# Patient Record
Sex: FEMALE | Race: WHITE | NOT HISPANIC OR LATINO | URBAN - METROPOLITAN AREA
[De-identification: names, ages, dates, MRNs, and addresses within clinical notes are randomized per-mention and may not be internally consistent; named-entity substitution may affect disease eponyms.]

---

## 2019-05-07 ENCOUNTER — INPATIENT HOSPITAL (OUTPATIENT)
Dept: URBAN - METROPOLITAN AREA HOSPITAL 107 | Facility: HOSPITAL | Age: 44
End: 2019-05-07
Payer: COMMERCIAL

## 2019-05-07 DIAGNOSIS — B18.2 CHRONIC VIRAL HEPATITIS C: ICD-10-CM

## 2019-05-07 DIAGNOSIS — D50.9 IRON DEFICIENCY ANEMIA, UNSPECIFIED: ICD-10-CM

## 2019-05-07 DIAGNOSIS — J96.00 ACUTE RESPIRATORY FAILURE, UNSPECIFIED WHETHER WITH HYPOXIA: ICD-10-CM

## 2019-05-07 DIAGNOSIS — R94.5 ABNORMAL RESULTS OF LIVER FUNCTION STUDIES: ICD-10-CM

## 2019-05-07 DIAGNOSIS — D69.3 IMMUNE THROMBOCYTOPENIC PURPURA: ICD-10-CM

## 2019-05-07 DIAGNOSIS — R13.10 DYSPHAGIA, UNSPECIFIED: ICD-10-CM

## 2019-05-07 PROCEDURE — 99223 1ST HOSP IP/OBS HIGH 75: CPT | Performed by: PHYSICIAN ASSISTANT

## 2019-05-08 ENCOUNTER — INPATIENT HOSPITAL (OUTPATIENT)
Dept: URBAN - METROPOLITAN AREA HOSPITAL 107 | Facility: HOSPITAL | Age: 44
End: 2019-05-08
Payer: COMMERCIAL

## 2019-05-08 DIAGNOSIS — E46 UNSPECIFIED PROTEIN-CALORIE MALNUTRITION: ICD-10-CM

## 2019-05-08 DIAGNOSIS — R13.10 DYSPHAGIA, UNSPECIFIED: ICD-10-CM

## 2019-05-08 PROCEDURE — 43246 EGD PLACE GASTROSTOMY TUBE: CPT | Performed by: INTERNAL MEDICINE

## 2019-05-09 ENCOUNTER — INPATIENT HOSPITAL (OUTPATIENT)
Dept: URBAN - METROPOLITAN AREA HOSPITAL 107 | Facility: HOSPITAL | Age: 44
End: 2019-05-09
Payer: COMMERCIAL

## 2019-05-09 DIAGNOSIS — A41.9 SEPSIS, UNSPECIFIED ORGANISM: ICD-10-CM

## 2019-05-09 DIAGNOSIS — Z93.1 GASTROSTOMY STATUS: ICD-10-CM

## 2019-05-09 DIAGNOSIS — R13.10 DYSPHAGIA, UNSPECIFIED: ICD-10-CM

## 2019-05-09 PROCEDURE — 99232 SBSQ HOSP IP/OBS MODERATE 35: CPT

## 2021-12-16 ENCOUNTER — TELEPHONE (OUTPATIENT)
Dept: CARDIAC SURGERY | Facility: CLINIC | Age: 46
End: 2021-12-16

## 2021-12-16 ENCOUNTER — HOSPITAL ENCOUNTER (INPATIENT)
Facility: HOSPITAL | Age: 46
LOS: 1 days | Discharge: LEFT BEFORE DISCHARGE | End: 2021-12-17
Attending: EMERGENCY MEDICINE | Admitting: INTERNAL MEDICINE

## 2021-12-16 ENCOUNTER — APPOINTMENT (OUTPATIENT)
Dept: CARDIOLOGY | Facility: HOSPITAL | Age: 46
End: 2021-12-16

## 2021-12-16 ENCOUNTER — APPOINTMENT (OUTPATIENT)
Dept: GENERAL RADIOLOGY | Facility: HOSPITAL | Age: 46
End: 2021-12-16

## 2021-12-16 ENCOUNTER — APPOINTMENT (OUTPATIENT)
Dept: CT IMAGING | Facility: HOSPITAL | Age: 46
End: 2021-12-16

## 2021-12-16 DIAGNOSIS — D65 CONSUMPTIVE COAGULOPATHY: ICD-10-CM

## 2021-12-16 DIAGNOSIS — I33.0 SUBACUTE BACTERIAL ENDOCARDITIS: Primary | ICD-10-CM

## 2021-12-16 DIAGNOSIS — N17.9 AKI (ACUTE KIDNEY INJURY): ICD-10-CM

## 2021-12-16 DIAGNOSIS — I76 SEPTIC EMBOLISM: ICD-10-CM

## 2021-12-16 DIAGNOSIS — D65 DIC (DISSEMINATED INTRAVASCULAR COAGULATION): ICD-10-CM

## 2021-12-16 PROBLEM — F19.10 POLYSUBSTANCE ABUSE: Status: ACTIVE | Noted: 2021-12-16

## 2021-12-16 PROBLEM — E87.20 LACTIC ACIDOSIS: Status: ACTIVE | Noted: 2021-12-16

## 2021-12-16 PROBLEM — E66.9 OBESITY (BMI 30-39.9): Status: ACTIVE | Noted: 2021-12-16

## 2021-12-16 PROBLEM — Z72.0 TOBACCO ABUSE: Status: ACTIVE | Noted: 2021-12-16

## 2021-12-16 PROBLEM — E83.42 HYPOMAGNESEMIA: Status: ACTIVE | Noted: 2021-12-16

## 2021-12-16 LAB
ABO GROUP BLD: NORMAL
ALBUMIN SERPL-MCNC: 1.9 G/DL (ref 3.5–5.2)
ALBUMIN/GLOB SERPL: 0.4 G/DL
ALP SERPL-CCNC: 95 U/L (ref 39–117)
ALT SERPL W P-5'-P-CCNC: 11 U/L (ref 1–33)
AMPHET+METHAMPHET UR QL: NEGATIVE
AMPHETAMINES UR QL: POSITIVE
ANION GAP SERPL CALCULATED.3IONS-SCNC: 10 MMOL/L (ref 5–15)
ANISOCYTOSIS BLD QL: ABNORMAL
APAP SERPL-MCNC: <5 MCG/ML (ref 0–30)
APTT PPP: 32 SECONDS (ref 24.1–35)
ARTERIAL PATENCY WRIST A: POSITIVE
AST SERPL-CCNC: 23 U/L (ref 1–32)
ATMOSPHERIC PRESS: 750 MMHG
BACTERIA BLD CULT: ABNORMAL
BACTERIA UR QL AUTO: ABNORMAL /HPF
BARBITURATES UR QL SCN: NEGATIVE
BASE EXCESS BLDA CALC-SCNC: 0.3 MMOL/L (ref 0–2)
BDY SITE: ABNORMAL
BENZODIAZ UR QL SCN: NEGATIVE
BH CV ECHO MEAS - ACS: 2.9 CM
BH CV ECHO MEAS - AO MAX PG (FULL): -0.55 MMHG
BH CV ECHO MEAS - AO MAX PG: 11.6 MMHG
BH CV ECHO MEAS - AO MEAN PG (FULL): -1 MMHG
BH CV ECHO MEAS - AO MEAN PG: 6 MMHG
BH CV ECHO MEAS - AO ROOT AREA (BSA CORRECTED): 1.6
BH CV ECHO MEAS - AO ROOT AREA: 7.5 CM^2
BH CV ECHO MEAS - AO ROOT DIAM: 3.1 CM
BH CV ECHO MEAS - AO V2 MAX: 170 CM/SEC
BH CV ECHO MEAS - AO V2 MEAN: 114 CM/SEC
BH CV ECHO MEAS - AO V2 VTI: 26 CM
BH CV ECHO MEAS - AVA(I,A): 4.7 CM^2
BH CV ECHO MEAS - AVA(I,D): 4.7 CM^2
BH CV ECHO MEAS - AVA(V,A): 4.3 CM^2
BH CV ECHO MEAS - AVA(V,D): 4.3 CM^2
BH CV ECHO MEAS - BSA(HAYCOCK): 2.1 M^2
BH CV ECHO MEAS - BSA: 2 M^2
BH CV ECHO MEAS - BZI_BMI: 33.3 KILOGRAMS/M^2
BH CV ECHO MEAS - BZI_METRIC_HEIGHT: 165.1 CM
BH CV ECHO MEAS - BZI_METRIC_WEIGHT: 90.7 KG
BH CV ECHO MEAS - EDV(CUBED): 161.9 ML
BH CV ECHO MEAS - EDV(MOD-SP4): 113 ML
BH CV ECHO MEAS - EDV(TEICH): 144.4 ML
BH CV ECHO MEAS - EF(CUBED): 87.4 %
BH CV ECHO MEAS - EF(MOD-SP4): 77.3 %
BH CV ECHO MEAS - EF(TEICH): 80.8 %
BH CV ECHO MEAS - ESV(CUBED): 20.3 ML
BH CV ECHO MEAS - ESV(MOD-SP4): 25.6 ML
BH CV ECHO MEAS - ESV(TEICH): 27.8 ML
BH CV ECHO MEAS - FS: 49.9 %
BH CV ECHO MEAS - IVS/LVPW: 0.96
BH CV ECHO MEAS - IVSD: 1.1 CM
BH CV ECHO MEAS - LA DIMENSION: 3.7 CM
BH CV ECHO MEAS - LA/AO: 1.2
BH CV ECHO MEAS - LAT PEAK E' VEL: 14.6 CM/SEC
BH CV ECHO MEAS - LV DIASTOLIC VOL/BSA (35-75): 57.1 ML/M^2
BH CV ECHO MEAS - LV MASS(C)D: 244.3 GRAMS
BH CV ECHO MEAS - LV MASS(C)DI: 123.5 GRAMS/M^2
BH CV ECHO MEAS - LV MAX PG: 12.1 MMHG
BH CV ECHO MEAS - LV MEAN PG: 7 MMHG
BH CV ECHO MEAS - LV SYSTOLIC VOL/BSA (12-30): 12.9 ML/M^2
BH CV ECHO MEAS - LV V1 MAX: 174 CM/SEC
BH CV ECHO MEAS - LV V1 MEAN: 118 CM/SEC
BH CV ECHO MEAS - LV V1 VTI: 29.5 CM
BH CV ECHO MEAS - LVIDD: 5.5 CM
BH CV ECHO MEAS - LVIDS: 2.7 CM
BH CV ECHO MEAS - LVLD AP4: 8.5 CM
BH CV ECHO MEAS - LVLS AP4: 5.9 CM
BH CV ECHO MEAS - LVOT AREA (M): 4.2 CM^2
BH CV ECHO MEAS - LVOT AREA: 4.2 CM^2
BH CV ECHO MEAS - LVOT DIAM: 2.3 CM
BH CV ECHO MEAS - LVPWD: 1.1 CM
BH CV ECHO MEAS - MED PEAK E' VEL: 10 CM/SEC
BH CV ECHO MEAS - MR MAX PG: 51.8 MMHG
BH CV ECHO MEAS - MR MAX VEL: 360 CM/SEC
BH CV ECHO MEAS - MR MEAN PG: 32 MMHG
BH CV ECHO MEAS - MR MEAN VEL: 258 CM/SEC
BH CV ECHO MEAS - MR VTI: 104 CM
BH CV ECHO MEAS - MV A MAX VEL: 57.8 CM/SEC
BH CV ECHO MEAS - MV DEC SLOPE: 560 CM/SEC^2
BH CV ECHO MEAS - MV DEC TIME: 0.22 SEC
BH CV ECHO MEAS - MV E MAX VEL: 72.8 CM/SEC
BH CV ECHO MEAS - MV E/A: 1.3
BH CV ECHO MEAS - MV P1/2T MAX VEL: 101 CM/SEC
BH CV ECHO MEAS - MV P1/2T: 52.8 MSEC
BH CV ECHO MEAS - MVA P1/2T LCG: 2.2 CM^2
BH CV ECHO MEAS - MVA(P1/2T): 4.2 CM^2
BH CV ECHO MEAS - PA MAX PG: 13 MMHG
BH CV ECHO MEAS - PA V2 MAX: 180 CM/SEC
BH CV ECHO MEAS - RAP SYSTOLE: 10 MMHG
BH CV ECHO MEAS - RVDD: 3.3 CM
BH CV ECHO MEAS - RVSP: 54.6 MMHG
BH CV ECHO MEAS - SI(AO): 99.2 ML/M^2
BH CV ECHO MEAS - SI(CUBED): 71.5 ML/M^2
BH CV ECHO MEAS - SI(LVOT): 62 ML/M^2
BH CV ECHO MEAS - SI(MOD-SP4): 44.2 ML/M^2
BH CV ECHO MEAS - SI(TEICH): 58.9 ML/M^2
BH CV ECHO MEAS - SV(AO): 196.2 ML
BH CV ECHO MEAS - SV(CUBED): 141.5 ML
BH CV ECHO MEAS - SV(LVOT): 122.6 ML
BH CV ECHO MEAS - SV(MOD-SP4): 87.4 ML
BH CV ECHO MEAS - SV(TEICH): 116.6 ML
BH CV ECHO MEAS - TR MAX VEL: 334 CM/SEC
BH CV ECHO MEASUREMENTS AVERAGE E/E' RATIO: 5.92
BILIRUB SERPL-MCNC: 0.7 MG/DL (ref 0–1.2)
BILIRUB UR QL STRIP: NEGATIVE
BLD GP AB SCN SERPL QL: NEGATIVE
BODY TEMPERATURE: 37 C
BUN SERPL-MCNC: 20 MG/DL (ref 6–20)
BUN/CREAT SERPL: 12.8 (ref 7–25)
BUPRENORPHINE SERPL-MCNC: NEGATIVE NG/ML
CALCIUM SPEC-SCNC: 7.4 MG/DL (ref 8.6–10.5)
CANNABINOIDS SERPL QL: NEGATIVE
CHLORIDE SERPL-SCNC: 99 MMOL/L (ref 98–107)
CK SERPL-CCNC: 14 U/L (ref 20–180)
CLARITY UR: ABNORMAL
CO2 SERPL-SCNC: 22 MMOL/L (ref 22–29)
COCAINE UR QL: NEGATIVE
COLOR UR: ABNORMAL
CREAT SERPL-MCNC: 1.56 MG/DL (ref 0.57–1)
CRP SERPL-MCNC: 15.1 MG/DL (ref 0–0.5)
D DIMER PPP FEU-MCNC: 5.72 MG/L (FEU) (ref 0–0.5)
D-LACTATE SERPL-SCNC: 1.5 MMOL/L (ref 0.5–2)
D-LACTATE SERPL-SCNC: 3.3 MMOL/L (ref 0.5–2)
DEPRECATED RDW RBC AUTO: 51.2 FL (ref 37–54)
ERYTHROCYTE [DISTWIDTH] IN BLOOD BY AUTOMATED COUNT: 17.5 % (ref 12.3–15.4)
ERYTHROCYTE [SEDIMENTATION RATE] IN BLOOD: 41 MM/HR (ref 0–20)
ETHANOL UR QL: <0.01 %
FIBRINOGEN PPP-MCNC: 331 MG/DL (ref 240–460)
GFR SERPL CREATININE-BSD FRML MDRD: 36 ML/MIN/1.73
GLOBULIN UR ELPH-MCNC: 5.3 GM/DL
GLUCOSE SERPL-MCNC: 137 MG/DL (ref 65–99)
GLUCOSE UR STRIP-MCNC: NEGATIVE MG/DL
GRAN CASTS URNS QL MICRO: ABNORMAL /LPF
HCG SERPL QL: NEGATIVE
HCO3 BLDA-SCNC: 22.9 MMOL/L (ref 20–26)
HCT VFR BLD AUTO: 22.7 % (ref 34–46.6)
HGB BLD-MCNC: 7.3 G/DL (ref 12–15.9)
HGB UR QL STRIP.AUTO: ABNORMAL
HYPOCHROMIA BLD QL: ABNORMAL
INR PPP: 1.1 (ref 0.91–1.09)
KETONES UR QL STRIP: NEGATIVE
LEFT ATRIUM VOLUME INDEX: 18.7 ML/M2
LEFT ATRIUM VOLUME: 37 CM3
LEUKOCYTE ESTERASE UR QL STRIP.AUTO: ABNORMAL
LYMPHOCYTES # BLD MANUAL: 1.07 10*3/MM3 (ref 0.7–3.1)
LYMPHOCYTES NFR BLD MANUAL: 6 % (ref 5–12)
Lab: ABNORMAL
MAGNESIUM SERPL-MCNC: 1.5 MG/DL (ref 1.6–2.6)
MCH RBC QN AUTO: 26.5 PG (ref 26.6–33)
MCHC RBC AUTO-ENTMCNC: 32.2 G/DL (ref 31.5–35.7)
MCV RBC AUTO: 82.5 FL (ref 79–97)
METHADONE UR QL SCN: NEGATIVE
MODALITY: ABNORMAL
MONOCYTES # BLD: 1.29 10*3/MM3 (ref 0.1–0.9)
NEUTROPHILS # BLD AUTO: 19.1 10*3/MM3 (ref 1.7–7)
NEUTROPHILS NFR BLD MANUAL: 81 % (ref 42.7–76)
NEUTS BAND NFR BLD MANUAL: 8 % (ref 0–5)
NITRITE UR QL STRIP: NEGATIVE
NRBC SPEC MANUAL: 1 /100 WBC (ref 0–0.2)
NT-PROBNP SERPL-MCNC: 8547 PG/ML (ref 0–450)
OPIATES UR QL: NEGATIVE
OXYCODONE UR QL SCN: NEGATIVE
PCO2 BLDA: 27.8 MM HG (ref 35–45)
PCO2 TEMP ADJ BLD: 27.8 MM HG (ref 35–45)
PCP UR QL SCN: NEGATIVE
PH BLDA: 7.52 PH UNITS (ref 7.35–7.45)
PH UR STRIP.AUTO: <=5 [PH] (ref 5–8)
PH, TEMP CORRECTED: 7.52 PH UNITS (ref 7.35–7.45)
PLATELET # BLD AUTO: 39 10*3/MM3 (ref 140–450)
PO2 BLDA: 75.2 MM HG (ref 83–108)
PO2 TEMP ADJ BLD: 75.2 MM HG (ref 83–108)
POIKILOCYTOSIS BLD QL SMEAR: ABNORMAL
POLYCHROMASIA BLD QL SMEAR: ABNORMAL
POTASSIUM SERPL-SCNC: 3.7 MMOL/L (ref 3.5–5.2)
PROCALCITONIN SERPL-MCNC: 1.11 NG/ML (ref 0–0.25)
PROPOXYPH UR QL: NEGATIVE
PROT SERPL-MCNC: 7.2 G/DL (ref 6–8.5)
PROT UR QL STRIP: ABNORMAL
PROTHROMBIN TIME: 13.8 SECONDS (ref 11.9–14.6)
QT INTERVAL: 326 MS
QTC INTERVAL: 472 MS
RBC # BLD AUTO: 2.75 10*6/MM3 (ref 3.77–5.28)
RBC # UR STRIP: ABNORMAL /HPF
REF LAB TEST METHOD: ABNORMAL
RETICS # AUTO: 0.08 10*6/MM3 (ref 0.02–0.13)
RETICS/RBC NFR AUTO: 2.88 % (ref 0.7–1.9)
RH BLD: POSITIVE
SALICYLATES SERPL-MCNC: <0.3 MG/DL
SAO2 % BLDCOA: 97.2 % (ref 94–99)
SARS-COV-2 RNA PNL SPEC NAA+PROBE: NOT DETECTED
SCHISTOCYTES BLD QL SMEAR: ABNORMAL
SMALL PLATELETS BLD QL SMEAR: ABNORMAL
SODIUM SERPL-SCNC: 131 MMOL/L (ref 136–145)
SP GR UR STRIP: 1.01 (ref 1–1.03)
SQUAMOUS #/AREA URNS HPF: ABNORMAL /HPF
T&S EXPIRATION DATE: NORMAL
TRICYCLICS UR QL SCN: NEGATIVE
TROPONIN T SERPL-MCNC: <0.01 NG/ML (ref 0–0.03)
UROBILINOGEN UR QL STRIP: ABNORMAL
VARIANT LYMPHS NFR BLD MANUAL: 5 % (ref 19.6–45.3)
VENTILATOR MODE: ABNORMAL
WBC # UR STRIP: ABNORMAL /HPF
WBC MORPH BLD: NORMAL
WBC NRBC COR # BLD: 21.46 10*3/MM3 (ref 3.4–10.8)

## 2021-12-16 PROCEDURE — 86900 BLOOD TYPING SEROLOGIC ABO: CPT | Performed by: EMERGENCY MEDICINE

## 2021-12-16 PROCEDURE — 82077 ASSAY SPEC XCP UR&BREATH IA: CPT | Performed by: EMERGENCY MEDICINE

## 2021-12-16 PROCEDURE — 87186 SC STD MICRODIL/AGAR DIL: CPT | Performed by: EMERGENCY MEDICINE

## 2021-12-16 PROCEDURE — 0 IOPAMIDOL PER 1 ML: Performed by: EMERGENCY MEDICINE

## 2021-12-16 PROCEDURE — 83880 ASSAY OF NATRIURETIC PEPTIDE: CPT | Performed by: EMERGENCY MEDICINE

## 2021-12-16 PROCEDURE — 82803 BLOOD GASES ANY COMBINATION: CPT

## 2021-12-16 PROCEDURE — 86850 RBC ANTIBODY SCREEN: CPT | Performed by: EMERGENCY MEDICINE

## 2021-12-16 PROCEDURE — 84145 PROCALCITONIN (PCT): CPT | Performed by: EMERGENCY MEDICINE

## 2021-12-16 PROCEDURE — 25010000002 FENTANYL CITRATE (PF) 50 MCG/ML SOLUTION: Performed by: EMERGENCY MEDICINE

## 2021-12-16 PROCEDURE — 80179 DRUG ASSAY SALICYLATE: CPT | Performed by: EMERGENCY MEDICINE

## 2021-12-16 PROCEDURE — 02HV33Z INSERTION OF INFUSION DEVICE INTO SUPERIOR VENA CAVA, PERCUTANEOUS APPROACH: ICD-10-PCS | Performed by: INTERNAL MEDICINE

## 2021-12-16 PROCEDURE — 85384 FIBRINOGEN ACTIVITY: CPT | Performed by: EMERGENCY MEDICINE

## 2021-12-16 PROCEDURE — 85025 COMPLETE CBC W/AUTO DIFF WBC: CPT | Performed by: EMERGENCY MEDICINE

## 2021-12-16 PROCEDURE — 85007 BL SMEAR W/DIFF WBC COUNT: CPT | Performed by: EMERGENCY MEDICINE

## 2021-12-16 PROCEDURE — 87040 BLOOD CULTURE FOR BACTERIA: CPT | Performed by: EMERGENCY MEDICINE

## 2021-12-16 PROCEDURE — 85045 AUTOMATED RETICULOCYTE COUNT: CPT | Performed by: EMERGENCY MEDICINE

## 2021-12-16 PROCEDURE — 25010000002 VANCOMYCIN 1 G RECONSTITUTED SOLUTION 1 EACH VIAL: Performed by: EMERGENCY MEDICINE

## 2021-12-16 PROCEDURE — 71045 X-RAY EXAM CHEST 1 VIEW: CPT

## 2021-12-16 PROCEDURE — 80053 COMPREHEN METABOLIC PANEL: CPT | Performed by: EMERGENCY MEDICINE

## 2021-12-16 PROCEDURE — 86140 C-REACTIVE PROTEIN: CPT | Performed by: EMERGENCY MEDICINE

## 2021-12-16 PROCEDURE — 80306 DRUG TEST PRSMV INSTRMNT: CPT | Performed by: EMERGENCY MEDICINE

## 2021-12-16 PROCEDURE — 36600 WITHDRAWAL OF ARTERIAL BLOOD: CPT

## 2021-12-16 PROCEDURE — 25010000002 VANCOMYCIN 10 G RECONSTITUTED SOLUTION: Performed by: INTERNAL MEDICINE

## 2021-12-16 PROCEDURE — 85610 PROTHROMBIN TIME: CPT | Performed by: EMERGENCY MEDICINE

## 2021-12-16 PROCEDURE — 80143 DRUG ASSAY ACETAMINOPHEN: CPT | Performed by: EMERGENCY MEDICINE

## 2021-12-16 PROCEDURE — 85379 FIBRIN DEGRADATION QUANT: CPT | Performed by: EMERGENCY MEDICINE

## 2021-12-16 PROCEDURE — 86901 BLOOD TYPING SEROLOGIC RH(D): CPT

## 2021-12-16 PROCEDURE — 93306 TTE W/DOPPLER COMPLETE: CPT

## 2021-12-16 PROCEDURE — 93005 ELECTROCARDIOGRAM TRACING: CPT | Performed by: EMERGENCY MEDICINE

## 2021-12-16 PROCEDURE — P9612 CATHETERIZE FOR URINE SPEC: HCPCS

## 2021-12-16 PROCEDURE — 70450 CT HEAD/BRAIN W/O DYE: CPT

## 2021-12-16 PROCEDURE — 71275 CT ANGIOGRAPHY CHEST: CPT

## 2021-12-16 PROCEDURE — 99253 IP/OBS CNSLTJ NEW/EST LOW 45: CPT | Performed by: SURGERY

## 2021-12-16 PROCEDURE — 86900 BLOOD TYPING SEROLOGIC ABO: CPT

## 2021-12-16 PROCEDURE — 87147 CULTURE TYPE IMMUNOLOGIC: CPT | Performed by: EMERGENCY MEDICINE

## 2021-12-16 PROCEDURE — 87150 DNA/RNA AMPLIFIED PROBE: CPT | Performed by: EMERGENCY MEDICINE

## 2021-12-16 PROCEDURE — 85652 RBC SED RATE AUTOMATED: CPT | Performed by: EMERGENCY MEDICINE

## 2021-12-16 PROCEDURE — 94799 UNLISTED PULMONARY SVC/PX: CPT

## 2021-12-16 PROCEDURE — 84703 CHORIONIC GONADOTROPIN ASSAY: CPT | Performed by: EMERGENCY MEDICINE

## 2021-12-16 PROCEDURE — 83735 ASSAY OF MAGNESIUM: CPT | Performed by: EMERGENCY MEDICINE

## 2021-12-16 PROCEDURE — 85730 THROMBOPLASTIN TIME PARTIAL: CPT | Performed by: EMERGENCY MEDICINE

## 2021-12-16 PROCEDURE — 86920 COMPATIBILITY TEST SPIN: CPT

## 2021-12-16 PROCEDURE — 93306 TTE W/DOPPLER COMPLETE: CPT | Performed by: INTERNAL MEDICINE

## 2021-12-16 PROCEDURE — 87635 SARS-COV-2 COVID-19 AMP PRB: CPT | Performed by: EMERGENCY MEDICINE

## 2021-12-16 PROCEDURE — 82550 ASSAY OF CK (CPK): CPT | Performed by: EMERGENCY MEDICINE

## 2021-12-16 PROCEDURE — 86901 BLOOD TYPING SEROLOGIC RH(D): CPT | Performed by: EMERGENCY MEDICINE

## 2021-12-16 PROCEDURE — C1751 CATH, INF, PER/CENT/MIDLINE: HCPCS

## 2021-12-16 PROCEDURE — 25010000002 CEFEPIME PER 500 MG: Performed by: INTERNAL MEDICINE

## 2021-12-16 PROCEDURE — 74177 CT ABD & PELVIS W/CONTRAST: CPT

## 2021-12-16 PROCEDURE — 87086 URINE CULTURE/COLONY COUNT: CPT | Performed by: EMERGENCY MEDICINE

## 2021-12-16 PROCEDURE — 25010000002 KETOROLAC TROMETHAMINE PER 15 MG: Performed by: INTERNAL MEDICINE

## 2021-12-16 PROCEDURE — 83605 ASSAY OF LACTIC ACID: CPT | Performed by: EMERGENCY MEDICINE

## 2021-12-16 PROCEDURE — 84484 ASSAY OF TROPONIN QUANT: CPT | Performed by: EMERGENCY MEDICINE

## 2021-12-16 PROCEDURE — 25010000002 PIPERACILLIN SOD-TAZOBACTAM PER 1 G: Performed by: EMERGENCY MEDICINE

## 2021-12-16 PROCEDURE — 93010 ELECTROCARDIOGRAM REPORT: CPT | Performed by: INTERNAL MEDICINE

## 2021-12-16 PROCEDURE — 81001 URINALYSIS AUTO W/SCOPE: CPT | Performed by: EMERGENCY MEDICINE

## 2021-12-16 PROCEDURE — 99285 EMERGENCY DEPT VISIT HI MDM: CPT

## 2021-12-16 PROCEDURE — 25010000002 ZIPRASIDONE MESYLATE PER 10 MG: Performed by: INTERNAL MEDICINE

## 2021-12-16 RX ORDER — KETOROLAC TROMETHAMINE 30 MG/ML
30 INJECTION, SOLUTION INTRAMUSCULAR; INTRAVENOUS EVERY 6 HOURS PRN
Status: DISCONTINUED | OUTPATIENT
Start: 2021-12-16 | End: 2021-12-17

## 2021-12-16 RX ORDER — ONDANSETRON 4 MG/1
4 TABLET, FILM COATED ORAL EVERY 6 HOURS PRN
Status: DISCONTINUED | OUTPATIENT
Start: 2021-12-16 | End: 2021-12-17 | Stop reason: HOSPADM

## 2021-12-16 RX ORDER — IPRATROPIUM BROMIDE AND ALBUTEROL SULFATE 2.5; .5 MG/3ML; MG/3ML
3 SOLUTION RESPIRATORY (INHALATION) EVERY 4 HOURS PRN
Status: DISCONTINUED | OUTPATIENT
Start: 2021-12-16 | End: 2021-12-17 | Stop reason: HOSPADM

## 2021-12-16 RX ORDER — ONDANSETRON 4 MG/1
4 TABLET, FILM COATED ORAL EVERY 6 HOURS PRN
Status: CANCELLED | OUTPATIENT
Start: 2021-12-16

## 2021-12-16 RX ORDER — LORAZEPAM 2 MG/ML
1 INJECTION INTRAMUSCULAR ONCE
Status: COMPLETED | OUTPATIENT
Start: 2021-12-17 | End: 2021-12-17

## 2021-12-16 RX ORDER — ACETAMINOPHEN 325 MG/1
650 TABLET ORAL EVERY 4 HOURS PRN
Status: DISCONTINUED | OUTPATIENT
Start: 2021-12-16 | End: 2021-12-17 | Stop reason: HOSPADM

## 2021-12-16 RX ORDER — ZIPRASIDONE MESYLATE 20 MG/ML
20 INJECTION, POWDER, LYOPHILIZED, FOR SOLUTION INTRAMUSCULAR ONCE
Status: COMPLETED | OUTPATIENT
Start: 2021-12-16 | End: 2021-12-16

## 2021-12-16 RX ORDER — ACETAMINOPHEN 500 MG
1000 TABLET ORAL ONCE
Status: COMPLETED | OUTPATIENT
Start: 2021-12-16 | End: 2021-12-16

## 2021-12-16 RX ORDER — SODIUM CHLORIDE 0.9 % (FLUSH) 0.9 %
10 SYRINGE (ML) INJECTION AS NEEDED
Status: DISCONTINUED | OUTPATIENT
Start: 2021-12-16 | End: 2021-12-17 | Stop reason: HOSPADM

## 2021-12-16 RX ORDER — NICOTINE 21 MG/24HR
1 PATCH, TRANSDERMAL 24 HOURS TRANSDERMAL
Status: DISCONTINUED | OUTPATIENT
Start: 2021-12-16 | End: 2021-12-17 | Stop reason: HOSPADM

## 2021-12-16 RX ORDER — SODIUM CHLORIDE 0.9 % (FLUSH) 0.9 %
10 SYRINGE (ML) INJECTION AS NEEDED
Status: CANCELLED | OUTPATIENT
Start: 2021-12-16

## 2021-12-16 RX ORDER — ACETAMINOPHEN 650 MG/1
650 SUPPOSITORY RECTAL EVERY 4 HOURS PRN
Status: DISCONTINUED | OUTPATIENT
Start: 2021-12-16 | End: 2021-12-17 | Stop reason: HOSPADM

## 2021-12-16 RX ORDER — LANOLIN ALCOHOL/MO/W.PET/CERES
6 CREAM (GRAM) TOPICAL ONCE
Status: COMPLETED | OUTPATIENT
Start: 2021-12-17 | End: 2021-12-17

## 2021-12-16 RX ORDER — ACETAMINOPHEN 160 MG/5ML
650 SOLUTION ORAL EVERY 4 HOURS PRN
Status: DISCONTINUED | OUTPATIENT
Start: 2021-12-16 | End: 2021-12-17 | Stop reason: HOSPADM

## 2021-12-16 RX ORDER — FENTANYL CITRATE 50 UG/ML
50 INJECTION, SOLUTION INTRAMUSCULAR; INTRAVENOUS ONCE
Status: COMPLETED | OUTPATIENT
Start: 2021-12-16 | End: 2021-12-16

## 2021-12-16 RX ORDER — ONDANSETRON 2 MG/ML
4 INJECTION INTRAMUSCULAR; INTRAVENOUS EVERY 6 HOURS PRN
Status: CANCELLED | OUTPATIENT
Start: 2021-12-16

## 2021-12-16 RX ORDER — SODIUM CHLORIDE 0.9 % (FLUSH) 0.9 %
10 SYRINGE (ML) INJECTION EVERY 12 HOURS SCHEDULED
Status: CANCELLED | OUTPATIENT
Start: 2021-12-16

## 2021-12-16 RX ORDER — ONDANSETRON 2 MG/ML
4 INJECTION INTRAMUSCULAR; INTRAVENOUS EVERY 6 HOURS PRN
Status: DISCONTINUED | OUTPATIENT
Start: 2021-12-16 | End: 2021-12-17 | Stop reason: HOSPADM

## 2021-12-16 RX ORDER — ACETAMINOPHEN 325 MG/1
650 TABLET ORAL EVERY 4 HOURS PRN
Status: CANCELLED | OUTPATIENT
Start: 2021-12-16

## 2021-12-16 RX ORDER — SODIUM CHLORIDE 0.9 % (FLUSH) 0.9 %
10 SYRINGE (ML) INJECTION EVERY 12 HOURS SCHEDULED
Status: DISCONTINUED | OUTPATIENT
Start: 2021-12-16 | End: 2021-12-17 | Stop reason: HOSPADM

## 2021-12-16 RX ADMIN — SODIUM CHLORIDE, POTASSIUM CHLORIDE, SODIUM LACTATE AND CALCIUM CHLORIDE 1000 ML: 600; 310; 30; 20 INJECTION, SOLUTION INTRAVENOUS at 09:26

## 2021-12-16 RX ADMIN — ZIPRASIDONE MESYLATE 20 MG: 20 INJECTION, POWDER, LYOPHILIZED, FOR SOLUTION INTRAMUSCULAR at 21:54

## 2021-12-16 RX ADMIN — SODIUM CHLORIDE, PRESERVATIVE FREE 10 ML: 5 INJECTION INTRAVENOUS at 21:54

## 2021-12-16 RX ADMIN — CEFEPIME 2 G: 2 INJECTION, POWDER, FOR SOLUTION INTRAVENOUS at 12:15

## 2021-12-16 RX ADMIN — ACETAMINOPHEN 1000 MG: 500 TABLET, FILM COATED ORAL at 10:58

## 2021-12-16 RX ADMIN — VANCOMYCIN HYDROCHLORIDE 1500 MG: 10 INJECTION, POWDER, LYOPHILIZED, FOR SOLUTION INTRAVENOUS at 21:53

## 2021-12-16 RX ADMIN — VANCOMYCIN HYDROCHLORIDE 1000 MG: 1 INJECTION, POWDER, LYOPHILIZED, FOR SOLUTION INTRAVENOUS at 09:26

## 2021-12-16 RX ADMIN — TAZOBACTAM SODIUM AND PIPERACILLIN SODIUM 4.5 G: 500; 4 INJECTION, SOLUTION INTRAVENOUS at 08:50

## 2021-12-16 RX ADMIN — NICOTINE 1 PATCH: 21 PATCH, EXTENDED RELEASE TRANSDERMAL at 17:56

## 2021-12-16 RX ADMIN — KETOROLAC TROMETHAMINE 30 MG: 30 INJECTION, SOLUTION INTRAMUSCULAR; INTRAVENOUS at 18:24

## 2021-12-16 RX ADMIN — FENTANYL CITRATE 50 MCG: 50 INJECTION INTRAMUSCULAR; INTRAVENOUS at 10:45

## 2021-12-16 RX ADMIN — IOPAMIDOL 100 ML: 755 INJECTION, SOLUTION INTRAVENOUS at 09:57

## 2021-12-16 NOTE — TELEPHONE ENCOUNTER
Records rec'd from [facility] from 2019.  If these are not the correct records, let me know and I will call them again.  Still waiting for records from [facility]./jose ramon

## 2021-12-16 NOTE — TELEPHONE ENCOUNTER
Messages left for the medical records departments at [facility] in [place] ([phone]) and [place] in [place] ([phone]) requesting records on this pt to be faxed to our office.  [facility] records would be current (w/i the last week) and [facility] would be from 2016./jose ramon

## 2021-12-17 ENCOUNTER — HOSPITAL ENCOUNTER (INPATIENT)
Age: 46
LOS: 2 days | Discharge: ANOTHER HOSPITAL | DRG: 871 | End: 2021-12-20
Attending: EMERGENCY MEDICINE | Admitting: STUDENT IN AN ORGANIZED HEALTH CARE EDUCATION/TRAINING PROGRAM
Payer: COMMERCIAL

## 2021-12-17 ENCOUNTER — APPOINTMENT (OUTPATIENT)
Dept: GENERAL RADIOLOGY | Age: 46
DRG: 871 | End: 2021-12-17
Payer: COMMERCIAL

## 2021-12-17 VITALS
SYSTOLIC BLOOD PRESSURE: 137 MMHG | BODY MASS INDEX: 43.09 KG/M2 | DIASTOLIC BLOOD PRESSURE: 94 MMHG | HEIGHT: 65 IN | OXYGEN SATURATION: 95 % | HEART RATE: 115 BPM | WEIGHT: 258.6 LBS | TEMPERATURE: 98.4 F | RESPIRATION RATE: 20 BRPM

## 2021-12-17 DIAGNOSIS — I33.0 ACUTE BACTERIAL ENDOCARDITIS: Primary | ICD-10-CM

## 2021-12-17 LAB
ALBUMIN SERPL-MCNC: 1.8 G/DL (ref 3.5–5.2)
ALP BLD-CCNC: 97 U/L (ref 35–104)
ALT SERPL-CCNC: 10 U/L (ref 5–33)
ANION GAP SERPL CALCULATED.3IONS-SCNC: 10 MMOL/L (ref 5–15)
ANION GAP SERPL CALCULATED.3IONS-SCNC: 10 MMOL/L (ref 7–19)
ANISOCYTOSIS BLD QL: ABNORMAL
AST SERPL-CCNC: 25 U/L (ref 5–32)
BACTERIA SPEC AEROBE CULT: NO GROWTH
BACTERIA UR QL AUTO: ABNORMAL /HPF
BASOPHILS ABSOLUTE: 0 K/UL (ref 0–0.2)
BASOPHILS RELATIVE PERCENT: 0 % (ref 0–1)
BILIRUB SERPL-MCNC: 0.5 MG/DL (ref 0.2–1.2)
BILIRUB UR QL STRIP: NEGATIVE
BUN BLDV-MCNC: 25 MG/DL (ref 6–20)
BUN SERPL-MCNC: 24 MG/DL (ref 6–20)
BUN/CREAT SERPL: 13.6 (ref 7–25)
BURR CELLS: ABNORMAL
CALCIUM SERPL-MCNC: 7.3 MG/DL (ref 8.6–10)
CALCIUM SPEC-SCNC: 7.4 MG/DL (ref 8.6–10.5)
CHLORIDE BLD-SCNC: 98 MMOL/L (ref 98–111)
CHLORIDE SERPL-SCNC: 98 MMOL/L (ref 98–107)
CLARITY UR: ABNORMAL
CO2 SERPL-SCNC: 22 MMOL/L (ref 22–29)
CO2: 20 MMOL/L (ref 22–29)
COLOR UR: ABNORMAL
CREAT SERPL-MCNC: 1.7 MG/DL (ref 0.5–0.9)
CREAT SERPL-MCNC: 1.77 MG/DL (ref 0.57–1)
DEPRECATED RDW RBC AUTO: 47.8 FL (ref 37–54)
EOSINOPHILS ABSOLUTE: 0 K/UL (ref 0–0.6)
EOSINOPHILS RELATIVE PERCENT: 0 % (ref 0–5)
ERYTHROCYTE [DISTWIDTH] IN BLOOD BY AUTOMATED COUNT: 16.9 % (ref 12.3–15.4)
GFR AFRICAN AMERICAN: 39
GFR NON-AFRICAN AMERICAN: 32
GFR SERPL CREATININE-BSD FRML MDRD: 31 ML/MIN/1.73
GLUCOSE BLD-MCNC: 146 MG/DL (ref 74–109)
GLUCOSE SERPL-MCNC: 102 MG/DL (ref 65–99)
GLUCOSE UR STRIP-MCNC: NEGATIVE MG/DL
GRAN CASTS URNS QL MICRO: ABNORMAL /LPF
HBA1C MFR BLD: 5.6 % (ref 4.8–5.6)
HCT VFR BLD AUTO: 19.6 % (ref 34–46.6)
HCT VFR BLD AUTO: 21.5 % (ref 34–46.6)
HCT VFR BLD CALC: 22.9 % (ref 37–47)
HEMOGLOBIN: 7.6 G/DL (ref 12–16)
HGB BLD-MCNC: 6.6 G/DL (ref 12–15.9)
HGB BLD-MCNC: 7.6 G/DL (ref 12–15.9)
HGB UR QL STRIP.AUTO: ABNORMAL
HIV1+2 AB SER QL: NORMAL
HYALINE CASTS UR QL AUTO: ABNORMAL /LPF
HYPERSEGMENTED NEUTROPHILS: ABNORMAL
HYPOCHROMIA BLD QL: ABNORMAL
IMMATURE GRANULOCYTES #: 0.4 K/UL
INR PPP: 1.28 (ref 0.91–1.09)
KETONES UR QL STRIP: NEGATIVE
LACTIC ACID: 2.6 MMOL/L (ref 0.5–1.9)
LEUKOCYTE ESTERASE UR QL STRIP.AUTO: ABNORMAL
LYMPHOCYTES # BLD MANUAL: 1.15 10*3/MM3 (ref 0.7–3.1)
LYMPHOCYTES ABSOLUTE: 0.7 K/UL (ref 1.1–4.5)
LYMPHOCYTES NFR BLD MANUAL: 4 % (ref 5–12)
LYMPHOCYTES RELATIVE PERCENT: 4 % (ref 20–40)
MAGNESIUM SERPL-MCNC: 1.5 MG/DL (ref 1.6–2.6)
MCH RBC QN AUTO: 27 PG (ref 26.6–33)
MCH RBC QN AUTO: 27.4 PG (ref 27–31)
MCHC RBC AUTO-ENTMCNC: 33.2 G/DL (ref 33–37)
MCHC RBC AUTO-ENTMCNC: 33.7 G/DL (ref 31.5–35.7)
MCV RBC AUTO: 80.3 FL (ref 79–97)
MCV RBC AUTO: 82.7 FL (ref 81–99)
MICROCYTES BLD QL: ABNORMAL
MONOCYTES # BLD: 0.77 10*3/MM3 (ref 0.1–0.9)
MONOCYTES ABSOLUTE: 0.5 K/UL (ref 0–0.9)
MONOCYTES RELATIVE PERCENT: 3 % (ref 0–10)
MYELOCYTE PERCENT: 1 %
NEUTROPHILS # BLD AUTO: 17.28 10*3/MM3 (ref 1.7–7)
NEUTROPHILS ABSOLUTE: 16.3 K/UL (ref 1.5–7.5)
NEUTROPHILS NFR BLD MANUAL: 87 % (ref 42.7–76)
NEUTROPHILS RELATIVE PERCENT: 92 % (ref 50–65)
NEUTS BAND NFR BLD MANUAL: 3 % (ref 0–5)
NITRITE UR QL STRIP: NEGATIVE
PDW BLD-RTO: 17 % (ref 11.5–14.5)
PH UR STRIP.AUTO: <=5 [PH] (ref 5–8)
PHOSPHATE SERPL-MCNC: 4.7 MG/DL (ref 2.5–4.5)
PLATELET # BLD AUTO: 43 10*3/MM3 (ref 140–450)
PLATELET # BLD: 73 K/UL (ref 130–400)
PLATELET SLIDE REVIEW: ABNORMAL
PMV BLD AUTO: ABNORMAL FL
POIKILOCYTES: ABNORMAL
POIKILOCYTOSIS BLD QL SMEAR: ABNORMAL
POLYCHROMASIA BLD QL SMEAR: ABNORMAL
POTASSIUM SERPL-SCNC: 3.8 MMOL/L (ref 3.5–5)
POTASSIUM SERPL-SCNC: 4 MMOL/L (ref 3.5–5.2)
PROCALCITONIN SERPL-MCNC: 1.74 NG/ML (ref 0–0.25)
PROT UR QL STRIP: ABNORMAL
PROTHROMBIN TIME: 15.5 SECONDS (ref 11.9–14.6)
RBC # BLD AUTO: 2.44 10*6/MM3 (ref 3.77–5.28)
RBC # BLD: 2.77 M/UL (ref 4.2–5.4)
RBC # UR STRIP: ABNORMAL /HPF
REF LAB TEST METHOD: ABNORMAL
SCHISTOCYTES BLD QL SMEAR: ABNORMAL
SCHISTOCYTES: ABNORMAL
SMALL PLATELETS BLD QL SMEAR: ABNORMAL
SMUDGE CELLS: ABNORMAL
SODIUM BLD-SCNC: 128 MMOL/L (ref 136–145)
SODIUM SERPL-SCNC: 130 MMOL/L (ref 136–145)
SODIUM UR-SCNC: <20 MMOL/L
SP GR UR STRIP: 1.02 (ref 1–1.03)
SQUAMOUS #/AREA URNS HPF: ABNORMAL /HPF
TARGET CELLS: ABNORMAL
TOTAL PROTEIN: 7 G/DL (ref 6.6–8.7)
TOXIC GRANULATION: ABNORMAL
TROPONIN: <0.01 NG/ML (ref 0–0.03)
TSH SERPL DL<=0.05 MIU/L-ACNC: 4.26 UIU/ML (ref 0.27–4.2)
UROBILINOGEN UR QL STRIP: ABNORMAL
VARIANT LYMPHS NFR BLD MANUAL: 6 % (ref 19.6–45.3)
WBC # BLD: 17.5 K/UL (ref 4.8–10.8)
WBC # UR STRIP: ABNORMAL /HPF
WBC MORPH BLD: NORMAL
WBC NRBC COR # BLD: 19.2 10*3/MM3 (ref 3.4–10.8)

## 2021-12-17 PROCEDURE — 2580000003 HC RX 258: Performed by: EMERGENCY MEDICINE

## 2021-12-17 PROCEDURE — 84484 ASSAY OF TROPONIN QUANT: CPT

## 2021-12-17 PROCEDURE — 36430 TRANSFUSION BLD/BLD COMPNT: CPT

## 2021-12-17 PROCEDURE — 83036 HEMOGLOBIN GLYCOSYLATED A1C: CPT | Performed by: INTERNAL MEDICINE

## 2021-12-17 PROCEDURE — 84443 ASSAY THYROID STIM HORMONE: CPT | Performed by: INTERNAL MEDICINE

## 2021-12-17 PROCEDURE — 36415 COLL VENOUS BLD VENIPUNCTURE: CPT

## 2021-12-17 PROCEDURE — 85018 HEMOGLOBIN: CPT | Performed by: INTERNAL MEDICINE

## 2021-12-17 PROCEDURE — 81001 URINALYSIS AUTO W/SCOPE: CPT | Performed by: INTERNAL MEDICINE

## 2021-12-17 PROCEDURE — 84540 ASSAY OF URINE/UREA-N: CPT | Performed by: INTERNAL MEDICINE

## 2021-12-17 PROCEDURE — 71045 X-RAY EXAM CHEST 1 VIEW: CPT

## 2021-12-17 PROCEDURE — G0432 EIA HIV-1/HIV-2 SCREEN: HCPCS | Performed by: INTERNAL MEDICINE

## 2021-12-17 PROCEDURE — 96361 HYDRATE IV INFUSION ADD-ON: CPT

## 2021-12-17 PROCEDURE — 82570 ASSAY OF URINE CREATININE: CPT | Performed by: INTERNAL MEDICINE

## 2021-12-17 PROCEDURE — 87040 BLOOD CULTURE FOR BACTERIA: CPT

## 2021-12-17 PROCEDURE — 25010000002 LORAZEPAM PER 2 MG: Performed by: INTERNAL MEDICINE

## 2021-12-17 PROCEDURE — 99231 SBSQ HOSP IP/OBS SF/LOW 25: CPT | Performed by: SURGERY

## 2021-12-17 PROCEDURE — 6370000000 HC RX 637 (ALT 250 FOR IP): Performed by: EMERGENCY MEDICINE

## 2021-12-17 PROCEDURE — 86900 BLOOD TYPING SEROLOGIC ABO: CPT

## 2021-12-17 PROCEDURE — 85025 COMPLETE CBC W/AUTO DIFF WBC: CPT | Performed by: INTERNAL MEDICINE

## 2021-12-17 PROCEDURE — 85007 BL SMEAR W/DIFF WBC COUNT: CPT | Performed by: INTERNAL MEDICINE

## 2021-12-17 PROCEDURE — 93005 ELECTROCARDIOGRAM TRACING: CPT | Performed by: EMERGENCY MEDICINE

## 2021-12-17 PROCEDURE — 83735 ASSAY OF MAGNESIUM: CPT | Performed by: INTERNAL MEDICINE

## 2021-12-17 PROCEDURE — P9016 RBC LEUKOCYTES REDUCED: HCPCS

## 2021-12-17 PROCEDURE — 85610 PROTHROMBIN TIME: CPT | Performed by: INTERNAL MEDICINE

## 2021-12-17 PROCEDURE — 85014 HEMATOCRIT: CPT | Performed by: INTERNAL MEDICINE

## 2021-12-17 PROCEDURE — 83605 ASSAY OF LACTIC ACID: CPT

## 2021-12-17 PROCEDURE — 99284 EMERGENCY DEPT VISIT MOD MDM: CPT

## 2021-12-17 PROCEDURE — 84100 ASSAY OF PHOSPHORUS: CPT | Performed by: INTERNAL MEDICINE

## 2021-12-17 PROCEDURE — 84145 PROCALCITONIN (PCT): CPT | Performed by: INTERNAL MEDICINE

## 2021-12-17 PROCEDURE — 85025 COMPLETE CBC W/AUTO DIFF WBC: CPT

## 2021-12-17 PROCEDURE — 80053 COMPREHEN METABOLIC PANEL: CPT

## 2021-12-17 PROCEDURE — 80048 BASIC METABOLIC PNL TOTAL CA: CPT | Performed by: INTERNAL MEDICINE

## 2021-12-17 PROCEDURE — 25010000002 KETOROLAC TROMETHAMINE PER 15 MG: Performed by: INTERNAL MEDICINE

## 2021-12-17 PROCEDURE — 84300 ASSAY OF URINE SODIUM: CPT | Performed by: INTERNAL MEDICINE

## 2021-12-17 RX ORDER — HYDROXYZINE HYDROCHLORIDE 25 MG/1
25 TABLET, FILM COATED ORAL EVERY 4 HOURS PRN
Status: DISCONTINUED | OUTPATIENT
Start: 2021-12-17 | End: 2021-12-17 | Stop reason: HOSPADM

## 2021-12-17 RX ORDER — SODIUM CHLORIDE, SODIUM LACTATE, POTASSIUM CHLORIDE, AND CALCIUM CHLORIDE .6; .31; .03; .02 G/100ML; G/100ML; G/100ML; G/100ML
1000 INJECTION, SOLUTION INTRAVENOUS ONCE
Status: COMPLETED | OUTPATIENT
Start: 2021-12-17 | End: 2021-12-18

## 2021-12-17 RX ORDER — SODIUM CHLORIDE, SODIUM LACTATE, POTASSIUM CHLORIDE, CALCIUM CHLORIDE 600; 310; 30; 20 MG/100ML; MG/100ML; MG/100ML; MG/100ML
500 INJECTION, SOLUTION INTRAVENOUS CONTINUOUS
Status: DISPENSED | OUTPATIENT
Start: 2021-12-17 | End: 2021-12-17

## 2021-12-17 RX ORDER — BUSPIRONE HYDROCHLORIDE 5 MG/1
5 TABLET ORAL EVERY 8 HOURS PRN
Status: DISCONTINUED | OUTPATIENT
Start: 2021-12-17 | End: 2021-12-17 | Stop reason: HOSPADM

## 2021-12-17 RX ORDER — TRAMADOL HYDROCHLORIDE 50 MG/1
25 TABLET ORAL EVERY 6 HOURS PRN
Status: DISCONTINUED | OUTPATIENT
Start: 2021-12-17 | End: 2021-12-17 | Stop reason: HOSPADM

## 2021-12-17 RX ORDER — ACETAMINOPHEN 325 MG/1
650 TABLET ORAL ONCE
Status: COMPLETED | OUTPATIENT
Start: 2021-12-17 | End: 2021-12-17

## 2021-12-17 RX ORDER — SODIUM CHLORIDE, SODIUM LACTATE, POTASSIUM CHLORIDE, CALCIUM CHLORIDE 600; 310; 30; 20 MG/100ML; MG/100ML; MG/100ML; MG/100ML
75 INJECTION, SOLUTION INTRAVENOUS CONTINUOUS
Status: DISCONTINUED | OUTPATIENT
Start: 2021-12-17 | End: 2021-12-17 | Stop reason: HOSPADM

## 2021-12-17 RX ORDER — LANOLIN ALCOHOL/MO/W.PET/CERES
6 CREAM (GRAM) TOPICAL NIGHTLY
Status: DISCONTINUED | OUTPATIENT
Start: 2021-12-17 | End: 2021-12-17 | Stop reason: HOSPADM

## 2021-12-17 RX ORDER — SODIUM CHLORIDE, SODIUM LACTATE, POTASSIUM CHLORIDE, CALCIUM CHLORIDE 600; 310; 30; 20 MG/100ML; MG/100ML; MG/100ML; MG/100ML
100 INJECTION, SOLUTION INTRAVENOUS CONTINUOUS
Status: DISCONTINUED | OUTPATIENT
Start: 2021-12-17 | End: 2021-12-17

## 2021-12-17 RX ADMIN — SODIUM CHLORIDE, POTASSIUM CHLORIDE, SODIUM LACTATE AND CALCIUM CHLORIDE 100 ML/HR: 600; 310; 30; 20 INJECTION, SOLUTION INTRAVENOUS at 06:47

## 2021-12-17 RX ADMIN — ACETAMINOPHEN 650 MG: 650 SUPPOSITORY RECTAL at 01:30

## 2021-12-17 RX ADMIN — NICOTINE 1 PATCH: 21 PATCH, EXTENDED RELEASE TRANSDERMAL at 09:06

## 2021-12-17 RX ADMIN — Medication 6 MG: at 00:04

## 2021-12-17 RX ADMIN — LORAZEPAM 1 MG: 2 INJECTION INTRAMUSCULAR; INTRAVENOUS at 00:00

## 2021-12-17 RX ADMIN — KETOROLAC TROMETHAMINE 30 MG: 30 INJECTION, SOLUTION INTRAMUSCULAR; INTRAVENOUS at 09:06

## 2021-12-17 RX ADMIN — ACETAMINOPHEN 650 MG: 325 TABLET ORAL at 22:00

## 2021-12-17 RX ADMIN — TRAMADOL HYDROCHLORIDE 25 MG: 50 TABLET, FILM COATED ORAL at 14:47

## 2021-12-17 RX ADMIN — SODIUM CHLORIDE, POTASSIUM CHLORIDE, SODIUM LACTATE AND CALCIUM CHLORIDE 500 ML: 600; 310; 30; 20 INJECTION, SOLUTION INTRAVENOUS at 01:31

## 2021-12-17 RX ADMIN — SODIUM CHLORIDE, POTASSIUM CHLORIDE, SODIUM LACTATE AND CALCIUM CHLORIDE 1000 ML: 600; 310; 30; 20 INJECTION, SOLUTION INTRAVENOUS at 22:48

## 2021-12-17 RX ADMIN — SODIUM CHLORIDE, POTASSIUM CHLORIDE, SODIUM LACTATE AND CALCIUM CHLORIDE 100 ML/HR: 600; 310; 30; 20 INJECTION, SOLUTION INTRAVENOUS at 02:09

## 2021-12-17 ASSESSMENT — PAIN DESCRIPTION - LOCATION: LOCATION: CHEST

## 2021-12-17 ASSESSMENT — PAIN SCALES - GENERAL
PAINLEVEL_OUTOF10: 10
PAINLEVEL_OUTOF10: 10

## 2021-12-18 PROBLEM — E87.20 LACTIC ACIDOSIS: Status: ACTIVE | Noted: 2021-12-16

## 2021-12-18 PROBLEM — E66.9 OBESITY (BMI 30-39.9): Status: ACTIVE | Noted: 2021-12-16

## 2021-12-18 PROBLEM — F19.10 DRUG ABUSE (HCC): Status: ACTIVE | Noted: 2021-12-18

## 2021-12-18 PROBLEM — F19.10 POLYSUBSTANCE ABUSE (HCC): Status: ACTIVE | Noted: 2021-12-16

## 2021-12-18 PROBLEM — R52 GENERALIZED PAIN: Status: ACTIVE | Noted: 2021-12-18

## 2021-12-18 PROBLEM — F19.90 IV DRUG USER: Status: ACTIVE | Noted: 2021-12-18

## 2021-12-18 PROBLEM — I33.0 BACTERIAL ENDOCARDITIS: Status: ACTIVE | Noted: 2021-12-16

## 2021-12-18 PROBLEM — R78.81 MRSA BACTEREMIA: Status: ACTIVE | Noted: 2021-12-18

## 2021-12-18 PROBLEM — I38 ENDOCARDITIS: Status: ACTIVE | Noted: 2021-12-18

## 2021-12-18 PROBLEM — B95.62 MRSA BACTEREMIA: Status: ACTIVE | Noted: 2021-12-18

## 2021-12-18 PROBLEM — B19.20 VIRAL HEPATITIS C: Status: ACTIVE | Noted: 2021-12-18

## 2021-12-18 LAB
ACINETOBACTER CALCOAC BAUMANNII COMPLEX BY PCR: NOT DETECTED
ANION GAP SERPL CALCULATED.3IONS-SCNC: 12 MMOL/L (ref 7–19)
BACTEROIDES FRAGILIS BY PCR: NOT DETECTED
BASOPHILS ABSOLUTE: 0 K/UL (ref 0–0.2)
BASOPHILS RELATIVE PERCENT: 0 % (ref 0–1)
BH BB BLOOD EXPIRATION DATE: NORMAL
BH BB BLOOD TYPE BARCODE: 5100
BH BB DISPENSE STATUS: NORMAL
BH BB PRODUCT CODE: NORMAL
BH BB UNIT NUMBER: NORMAL
BUN BLDV-MCNC: 25 MG/DL (ref 6–20)
BURR CELLS: ABNORMAL
CALCIUM SERPL-MCNC: 7.2 MG/DL (ref 8.6–10)
CANDIDA ALBICANS BY PCR: NOT DETECTED
CANDIDA AURIS BY PCR: NOT DETECTED
CANDIDA GLABRATA BY PCR: NOT DETECTED
CANDIDA KRUSEI BY PCR: NOT DETECTED
CANDIDA PARAPSILOSIS BY PCR: NOT DETECTED
CANDIDA TROPICALIS BY PCR: NOT DETECTED
CHLORIDE BLD-SCNC: 101 MMOL/L (ref 98–111)
CO2: 19 MMOL/L (ref 22–29)
CREAT SERPL-MCNC: 1.9 MG/DL (ref 0.5–0.9)
CREAT UR-MCNC: 136.3 MG/DL
CROSSMATCH INTERPRETATION: NORMAL
CRYPTOCOCCUS NEOFORMANS/GATTII BY PCR: NOT DETECTED
ENTEROBACTER CLOACAE COMPLEX BY PCR: NOT DETECTED
ENTEROBACTERALES BY PCR: NOT DETECTED
ENTEROCOCCUS FAECALIS BY PCR: NOT DETECTED
ENTEROCOCCUS FAECIUM BY PCR: NOT DETECTED
EOSINOPHILS ABSOLUTE: 0.3 K/UL (ref 0–0.6)
EOSINOPHILS RELATIVE PERCENT: 2 % (ref 0–5)
ESCHERICHIA COLI BY PCR: NOT DETECTED
GFR AFRICAN AMERICAN: 35
GFR NON-AFRICAN AMERICAN: 29
GLUCOSE BLD-MCNC: 109 MG/DL (ref 74–109)
HAEMOPHILUS INFLUENZAE BY PCR: NOT DETECTED
HCT VFR BLD CALC: 23 % (ref 37–47)
HEMOGLOBIN: 7.5 G/DL (ref 12–16)
IMMATURE GRANULOCYTES #: 0.4 K/UL
KLEBSIELLA AEROGENES BY PCR: NOT DETECTED
KLEBSIELLA OXYTOCA BY PCR: NOT DETECTED
KLEBSIELLA PNEUMONIAE GROUP BY PCR: NOT DETECTED
LISTERIA MONOCYTOGENES BY PCR: NOT DETECTED
LYMPHOCYTES ABSOLUTE: 1.2 K/UL (ref 1.1–4.5)
LYMPHOCYTES RELATIVE PERCENT: 8 % (ref 20–40)
MCH RBC QN AUTO: 27.3 PG (ref 27–31)
MCHC RBC AUTO-ENTMCNC: 32.6 G/DL (ref 33–37)
MCV RBC AUTO: 83.6 FL (ref 81–99)
METHICILLIN RESISTANCE MECA/C AND MREJ BY PCR: DETECTED
MONOCYTES ABSOLUTE: 0.7 K/UL (ref 0–0.9)
MONOCYTES RELATIVE PERCENT: 5 % (ref 0–10)
NEISSERIA MENINGITIDIS BY PCR: NOT DETECTED
NEUTROPHILS ABSOLUTE: 12.7 K/UL (ref 1.5–7.5)
NEUTROPHILS RELATIVE PERCENT: 85 % (ref 50–65)
PDW BLD-RTO: 17.2 % (ref 11.5–14.5)
PLATELET # BLD: 77 K/UL (ref 130–400)
PLATELET SLIDE REVIEW: ABNORMAL
POIKILOCYTES: ABNORMAL
POLYCHROMASIA: ABNORMAL
POTASSIUM REFLEX MAGNESIUM: 3.7 MMOL/L (ref 3.5–5)
PROTEUS SPECIES BY PCR: NOT DETECTED
PSEUDOMONAS AERUGINOSA BY PCR: NOT DETECTED
RBC # BLD: 2.75 M/UL (ref 4.2–5.4)
SALMONELLA SPECIES BY PCR: NOT DETECTED
SCHISTOCYTES: ABNORMAL
SERRATIA MARCESCENS BY PCR: NOT DETECTED
SODIUM BLD-SCNC: 132 MMOL/L (ref 136–145)
STAPHYLOCOCCUS AUREUS BY PCR: DETECTED
STAPHYLOCOCCUS EPIDERMIDIS BY PCR: NOT DETECTED
STAPHYLOCOCCUS LUGDUNENSIS BY PCR: NOT DETECTED
STAPHYLOCOCCUS SPECIES BY PCR: DETECTED
STENOTROPHOMONAS MALTOPHILIA BY PCR: NOT DETECTED
STREPTOCOCCUS AGALACTIAE BY PCR: NOT DETECTED
STREPTOCOCCUS PNEUMONIAE BY PCR: NOT DETECTED
STREPTOCOCCUS PYOGENES  BY PCR: NOT DETECTED
STREPTOCOCCUS SPECIES BY PCR: NOT DETECTED
TOXIC GRANULATION: ABNORMAL
UNIT  ABO: NORMAL
UNIT  RH: NORMAL
UUN 24H UR-MCNC: 536 MG/DL
WBC # BLD: 14.9 K/UL (ref 4.8–10.8)

## 2021-12-18 PROCEDURE — 36415 COLL VENOUS BLD VENIPUNCTURE: CPT

## 2021-12-18 PROCEDURE — 87040 BLOOD CULTURE FOR BACTERIA: CPT

## 2021-12-18 PROCEDURE — 2580000003 HC RX 258: Performed by: EMERGENCY MEDICINE

## 2021-12-18 PROCEDURE — 6370000000 HC RX 637 (ALT 250 FOR IP): Performed by: STUDENT IN AN ORGANIZED HEALTH CARE EDUCATION/TRAINING PROGRAM

## 2021-12-18 PROCEDURE — 6360000002 HC RX W HCPCS: Performed by: STUDENT IN AN ORGANIZED HEALTH CARE EDUCATION/TRAINING PROGRAM

## 2021-12-18 PROCEDURE — 85025 COMPLETE CBC W/AUTO DIFF WBC: CPT

## 2021-12-18 PROCEDURE — 6360000002 HC RX W HCPCS: Performed by: EMERGENCY MEDICINE

## 2021-12-18 PROCEDURE — 6360000002 HC RX W HCPCS

## 2021-12-18 PROCEDURE — 96375 TX/PRO/DX INJ NEW DRUG ADDON: CPT

## 2021-12-18 PROCEDURE — 2140000000 HC CCU INTERMEDIATE R&B

## 2021-12-18 PROCEDURE — 87150 DNA/RNA AMPLIFIED PROBE: CPT

## 2021-12-18 PROCEDURE — 99222 1ST HOSP IP/OBS MODERATE 55: CPT | Performed by: PSYCHIATRY & NEUROLOGY

## 2021-12-18 PROCEDURE — 80048 BASIC METABOLIC PNL TOTAL CA: CPT

## 2021-12-18 PROCEDURE — 96365 THER/PROPH/DIAG IV INF INIT: CPT

## 2021-12-18 PROCEDURE — 87186 SC STD MICRODIL/AGAR DIL: CPT

## 2021-12-18 RX ORDER — NICOTINE 21 MG/24HR
1 PATCH, TRANSDERMAL 24 HOURS TRANSDERMAL DAILY
Status: DISCONTINUED | OUTPATIENT
Start: 2021-12-18 | End: 2021-12-20 | Stop reason: HOSPADM

## 2021-12-18 RX ORDER — ONDANSETRON 2 MG/ML
4 INJECTION INTRAMUSCULAR; INTRAVENOUS EVERY 6 HOURS PRN
Status: DISCONTINUED | OUTPATIENT
Start: 2021-12-18 | End: 2021-12-20 | Stop reason: HOSPADM

## 2021-12-18 RX ORDER — BENZONATATE 100 MG/1
200 CAPSULE ORAL 3 TIMES DAILY PRN
Status: DISCONTINUED | OUTPATIENT
Start: 2021-12-18 | End: 2021-12-20 | Stop reason: HOSPADM

## 2021-12-18 RX ORDER — LORAZEPAM 0.5 MG/1
0.5 TABLET ORAL EVERY 6 HOURS PRN
Status: DISCONTINUED | OUTPATIENT
Start: 2021-12-18 | End: 2021-12-20 | Stop reason: HOSPADM

## 2021-12-18 RX ORDER — OXYCODONE HYDROCHLORIDE AND ACETAMINOPHEN 5; 325 MG/1; MG/1
1 TABLET ORAL EVERY 4 HOURS PRN
Status: DISCONTINUED | OUTPATIENT
Start: 2021-12-18 | End: 2021-12-20 | Stop reason: HOSPADM

## 2021-12-18 RX ORDER — MORPHINE SULFATE 2 MG/ML
2 INJECTION, SOLUTION INTRAMUSCULAR; INTRAVENOUS
Status: DISCONTINUED | OUTPATIENT
Start: 2021-12-18 | End: 2021-12-20 | Stop reason: HOSPADM

## 2021-12-18 RX ORDER — KETOROLAC TROMETHAMINE 30 MG/ML
30 INJECTION, SOLUTION INTRAMUSCULAR; INTRAVENOUS ONCE
Status: COMPLETED | OUTPATIENT
Start: 2021-12-18 | End: 2021-12-18

## 2021-12-18 RX ORDER — ACETAMINOPHEN 325 MG/1
650 TABLET ORAL EVERY 4 HOURS PRN
Status: DISCONTINUED | OUTPATIENT
Start: 2021-12-18 | End: 2021-12-20 | Stop reason: HOSPADM

## 2021-12-18 RX ORDER — KETOROLAC TROMETHAMINE 30 MG/ML
INJECTION, SOLUTION INTRAMUSCULAR; INTRAVENOUS
Status: COMPLETED
Start: 2021-12-18 | End: 2021-12-18

## 2021-12-18 RX ADMIN — LORAZEPAM 0.5 MG: 0.5 TABLET ORAL at 12:40

## 2021-12-18 RX ADMIN — BENZONATATE 200 MG: 100 CAPSULE ORAL at 11:55

## 2021-12-18 RX ADMIN — Medication 1000 MG: at 00:15

## 2021-12-18 RX ADMIN — BENZONATATE 200 MG: 100 CAPSULE ORAL at 05:46

## 2021-12-18 RX ADMIN — ACETAMINOPHEN 650 MG: 325 TABLET ORAL at 19:39

## 2021-12-18 RX ADMIN — OXYCODONE HYDROCHLORIDE AND ACETAMINOPHEN 1 TABLET: 5; 325 TABLET ORAL at 02:18

## 2021-12-18 RX ADMIN — KETOROLAC TROMETHAMINE 30 MG: 30 INJECTION, SOLUTION INTRAMUSCULAR; INTRAVENOUS at 00:30

## 2021-12-18 RX ADMIN — ACETAMINOPHEN 650 MG: 325 TABLET ORAL at 08:24

## 2021-12-18 RX ADMIN — OXYCODONE HYDROCHLORIDE AND ACETAMINOPHEN 1 TABLET: 5; 325 TABLET ORAL at 22:02

## 2021-12-18 RX ADMIN — OXYCODONE HYDROCHLORIDE AND ACETAMINOPHEN 1 TABLET: 5; 325 TABLET ORAL at 09:45

## 2021-12-18 RX ADMIN — OXYCODONE HYDROCHLORIDE AND ACETAMINOPHEN 1 TABLET: 5; 325 TABLET ORAL at 17:44

## 2021-12-18 RX ADMIN — OXYCODONE HYDROCHLORIDE AND ACETAMINOPHEN 1 TABLET: 5; 325 TABLET ORAL at 05:46

## 2021-12-18 RX ADMIN — SODIUM CHLORIDE 2000 MG: 9 INJECTION, SOLUTION INTRAMUSCULAR; INTRAVENOUS; SUBCUTANEOUS at 00:14

## 2021-12-18 RX ADMIN — MORPHINE SULFATE 2 MG: 2 INJECTION, SOLUTION INTRAMUSCULAR; INTRAVENOUS at 20:04

## 2021-12-18 ASSESSMENT — PAIN SCALES - GENERAL
PAINLEVEL_OUTOF10: 8
PAINLEVEL_OUTOF10: 7
PAINLEVEL_OUTOF10: 0
PAINLEVEL_OUTOF10: 8
PAINLEVEL_OUTOF10: 10
PAINLEVEL_OUTOF10: 10
PAINLEVEL_OUTOF10: 7
PAINLEVEL_OUTOF10: 8
PAINLEVEL_OUTOF10: 10
PAINLEVEL_OUTOF10: 8

## 2021-12-18 ASSESSMENT — ENCOUNTER SYMPTOMS
TROUBLE SWALLOWING: 0
COUGH: 1
COLOR CHANGE: 0
SHORTNESS OF BREATH: 1
PHOTOPHOBIA: 0
WHEEZING: 0
NAUSEA: 1
SORE THROAT: 0
RHINORRHEA: 0
EYE PAIN: 0
CHEST TIGHTNESS: 0
VOMITING: 0
ABDOMINAL DISTENTION: 0
BACK PAIN: 0
DIARRHEA: 0
CONSTIPATION: 0
ABDOMINAL PAIN: 1

## 2021-12-18 ASSESSMENT — PAIN DESCRIPTION - LOCATION: LOCATION: GENERALIZED

## 2021-12-18 NOTE — PROGRESS NOTES
Hollis Renae arrived to room # [id number]. Presented with: endocarditis  Mental Status: Patient is oriented, alert, coherent, logical, thought processes intact and able to concentrate and follow conversation. Vitals:    12/18/21 0125   BP: 123/84   Pulse: 99   Resp: 18   Temp: 97.3 °F (36.3 °C)   SpO2: 97%     Patient safety contract and falls prevention contract reviewed with patient Yes. Oriented Patient to room. Call light within reach. Yes. Needs, issues or concerns expressed at this time: yes, wants pain meds and a popsicle.       Electronically signed by Elmon Galeazzi, RN on 12/18/2021 at 1:56 AM

## 2021-12-18 NOTE — CONSULTS
[facility]  Psychiatry Consult    Reason for Consult: Concern   \"Patient stated she wanted to blow her brains out\"    The primary source(s) of information include(s):  Patient    The patient is a 40 y.o. female without reported previous psychiatric history, with history of opioid use disorder, who has been admitted to medical services secondary to endocarditis. Patient chart has been reviewed. Patient has been seen in her room with presence of one-to-one sitter. Patient reported that during the last 12 days she was in multiple hospitals in [place] and [place], stated that she left hospital in [place], hospital in [facility] and [place] [address], as she felt that she does not need to be there or she was not provided with right treatment. Patient stated that she moved back to [place], as she wanted to be with her father. Patient reported that during the last almost 2 weeks she has been prescribed antibiotics for endocarditis and she missed doses of antibiotic \"only for 2 days\". She reported that she came to the hospital to arrange her transfer to [facility] in [facility] for the surgery, as it was found that she has had septic pulmonary emboli. Patient reported history of abusing heroine for last 6 years, stated that during the last 5 years she was injecting heroin IV \"up to 15 times a day, every day. It is 3.5 g of heroin a day\". Patient reported that last time when she injected heroin is 3 months ago. She denies any withdrawal symptoms from opioids today. Patient adamantly denies that she made any suicidal statements in the emergency department, stated \"I told them it hurts so bad that I could blow my brain out, but I did not say that I am suicidal\".     In regards of affective symptomatology, patient denies feeling of depression, anxiety or psychotic symptoms, she denies feeling of hopelessness, helplessness or worthlessness, endorses fair appetite and reported decreased quality of sleep. Patient denies current active suicidal or homicidal ideations, denies any plans. Also, she denies any auditory and visual hallucinations. PSYCHIATRIC HISTORY:  Diagnoses: Denies  Suicide attempts/gestures: Denies   Prior hospitalizations: Denies   Medication trials: Denies   Mental health contact: Denies  Head trauma: Denies    Allergies:  Patient has no known allergies. Mental Status  Appearance: Properly groomed wearing casual civilian clothes. Made good eye contact. Behavior: Slightly anxious, cooperative with interview, socially appropriate. No psychomotor retardation or agitation appreciated. Gait was not evaluated, as patient was sitting on her bed. Speech: Normal in tone, volume, and quality. Mood: \"I feel okay\"   Affect: Mood congruent. Range is full  Thought Process: Mostly circumstantial, sometimes linear and goal oriented. Thought Content: Patient does not have any current active suicidal and homicidal ideations. No overt delusions or paranoia appreciated. Perceptions: Seems patient does not have any auditory or visual hallucinations at present time. Patient did not appear to be responding to internal stimuli. No overt psychosis. Executive Functions: Appear mildly impaired. Concentration: Decreased  Reasoning: Appears mildly impaired based on interaction from interview   Orientation: to person, place, date, and situation. Alert. Language: Intact. Fund of information: Intact. Memory: recent and remote appear intact. Impulsivity: Limited  Neurovegitative: Fair appetite, decreased sleep. Insight: Limited  Judgment: Limited    Assessment  DSM 5 DIAGNOSIS:  Opioid use disorder, severe, in early remission  Substance-induced mood disorder    Recommendations  1. Currently patient is not suicidal, homicidal or psychotic. Patient does not meet criteria for psychiatric hospitalization.    2. Patient does have a capacity of making her own medical decisions. 3.  Recommended to discontinue one-to-one sitter. 4.  No recommendations with initiation of psychotropic medications at this time. 5.  Patient can be discharged from the hospital upon discretion of the primary treatment team.  6.  Psychiatry will sign off today.       Gene Jimenez MD

## 2021-12-18 NOTE — PROGRESS NOTES
Pharmacy Note  Vancomycin Consult    Brown Parks is a 40 y.o. female started on Vancomycin for Bloodstream Infection; consult received from Dr. Poppy Moore to manage therapy. Active Problems:    Endocarditis    Generalized pain    MRSA bacteremia    Drug abuse ([address]  Resolved Problems:    * No resolved hospital problems. *      Allergies:  Patient has no known allergies. Temp max: 99.1    Recent Labs     12/17/21 2115   BUN 25*       Recent Labs     12/17/21 2115   CREATININE 1.7*       Recent Labs     12/17/21 2115   WBC 17.5*         Intake/Output Summary (Last 24 hours) at 12/18/2021 0329  Last data filed at 12/18/2021 0115  Gross per 24 hour   Intake 250 ml   Output --   Net 250 ml     No current cultures at this time  Culture Date Source Results                      Ht Readings from Last 1 Encounters:   12/17/21 5' 5\" (1.651 m)        Wt Readings from Last 1 Encounters:   12/18/21 258 lb 6.4 oz (117.2 kg)         Body mass index is 43 kg/m². Estimated Creatinine Clearance: 54 mL/min (A) (based on SCr of 1.7 mg/dL (H)). Assessment/Plan:  Will initiate vancomycin as Pulse Dosing due to decline in patients renal function. Timing of trough level will be determined based on culture results, renal function, and clinical response. Thank you for the consult. Will continue to follow.     Electronically signed by Ania Weiss, [address] [place] [address] on 12/18/2021 at 3:29 AM

## 2021-12-18 NOTE — PROGRESS NOTES
This nurse took patient their pain medicine (see EMAR) because patient asked for pain medicine and rated her pain 10 out of 10 all over her body. Upon entering the room patient was audibly snoring. This nurse had to physically wake patient to ask if patient still wanted her pain medicine. Patient stated yes, rated her pain 10/10 again. Pain medicine was given (see emar) and within minutes patient was snoring once again and had to be woken up to finish admission assessment.  Electronically signed by Deya Parham RN on 12/18/2021 at 2:52 AM

## 2021-12-18 NOTE — PROGRESS NOTES
[facility]        Hospitalist Progress Note  12/18/2021 9:19 AM  Subjective:   Admit Date: 12/17/2021  PCP: No primary care provider on file. Subjective: Patient was seen and examined by the bedside this morning. She complained of constant, sharp, moderately severe headache, neck, back and upper abdominal pain. Patient has no known aggravating or relieving factor. She has no fever, chills, rigor, change in bowel or urinary habits. Cumulative Hospital History:   Patient is a 81-year-old female with medical history significant for polysubstance abuse, MRSA bacteremia and infective endocarditis was admitted on account of chest discomfort and untreated infective endocarditis. ROS: 14 point review of systems is negative except as specifically addressed above. ADULT DIET;  Regular; Low Fat/Low Chol/High Fiber/SHANTEL    Intake/Output Summary (Last 24 hours) at 12/18/2021 0919  Last data filed at 12/18/2021 7192  Gross per 24 hour   Intake 650 ml   Output 200 ml   Net 450 ml     Medications:  Current Facility-Administered Medications   Medication Dose Route Frequency Provider Last Rate Last Admin    oxyCODONE-acetaminophen (PERCOCET) 5-325 MG per tablet 1 tablet  1 tablet Oral Q4H PRN Nica Reina MD   1 tablet at 12/18/21 0546    morphine (PF) injection 2 mg  2 mg IntraVENous Q3H PRN Nica Reina MD        ondansetron [facility]) injection 4 mg  4 mg IntraVENous Q6H PRN Nica Reina MD        vancomycin Yomi Velásquez) intermittent dosing (placeholder)   Other RX Placeholder Nica Reina MD        benzonatate (TESSALON) capsule 200 mg  200 mg Oral TID PRN Nica Reina MD   200 mg at 12/18/21 0546    acetaminophen (TYLENOL) tablet 650 mg  650 mg Oral Q4H PRN Conner Capellan MD   650 mg at 12/18/21 0824        Labs:     Recent Labs     12/17/21 2115 12/18/21  0443   WBC 17.5* 14.9*   RBC 2.77* 2.75*   HGB 7.6* 7.5*   HCT 22.9* 23.0*   MCV 82.7 83.6   MCH 27.4 27.3   MCHC 33.2 32.6* PLT 73* 77*     Recent Labs     12/17/21 2115 12/18/21  0443   * 132*   K 3.8 3.7   ANIONGAP 10 12   CL 98 101   CO2 20* 19*   BUN 25* 25*   CREATININE 1.7* 1.9*   GLUCOSE 146* 109   CALCIUM 7.3* 7.2*     No results for input(s): MG, PHOS in the last 72 hours. Recent Labs     12/17/21 2115   AST 25   ALT 10   BILITOT 0.5   ALKPHOS 97     ABGs:No results for input(s): PH, PO2, PCO2, HCO3, BE, O2SAT in the last 72 hours. Troponin T:   Recent Labs     12/17/21 2115   Leocadia Chain <0.01     INR: No results for input(s): INR in the last 72 hours. Lactic Acid:   Recent Labs     12/17/21 2126   LACTA 2.6*       Objective:   Vitals: /77   Pulse 104   Temp 97.3 °F (36.3 °C) (Temporal)   Resp 18   Ht 5' 5\" (1.651 m)   Wt 258 lb 6.4 oz (117.2 kg)   LMP 12/05/2021   SpO2 95%   BMI 43.00 kg/m²   24HR INTAKE/OUTPUT:      Intake/Output Summary (Last 24 hours) at 12/18/2021 0919  Last data filed at 12/18/2021 0618  Gross per 24 hour   Intake 650 ml   Output 200 ml   Net 450 ml     General appearance: Wagner Pop adult obese female seen lying down in no acute cardiopulmonary distress. Alert and cooperative with exam  HEENT: atraumatic, eyes with clear conjunctiva and normal lids, pupils and irises normal, external ears and nose are normal, lips normal  Lungs: no increased work of breathing, CTA B  Heart: Tachycardic, S1-S2 heard, no m,r,g  Abdomen: Soft, NT, ND, BS +  Extremities: No cyanosis, no edema. Peripheral pulses present  Neurologic: Alert and oriented, affect and mood appropriate  Skin: no rashes, nodules    Assessment and Plan: Active Problems:    Endocarditis    Generalized pain    MRSA bacteremia    Drug abuse ([address]  Resolved Problems:    * No resolved hospital problems.  *    Plan  #Infective endocarditis  #MRSA bacteremia  #Noncompliance with medical therapy  #History of polysubstance abuse  -Patient counseled extensively on cessation of drugs of abuse  -Follow-up blood cultures  -Continue vancomycin for now  -Continue as needed pain meds  -Patient is awaiting placement at [facility]    #Other comorbidities-continue home meds      Advance Directive: Full Code    DVT prophylaxis: Lovenox    Discharge planning: Patient is awaiting placement at [facility]      Signed:  Amador Borrero MD 12/18/2021 9:19 AM  Rounding Hospitalist

## 2021-12-18 NOTE — PROGRESS NOTES
While phlebotomist attempted to draw blood cultures patient stated \"just stick the needle in my fucking forehead\" then continued to cuss the phlebotomist and told her not to come back and she refused to have any more blood draws. Phlebotomist left the room but was able to successfully draw 1 out of 2 sets of blood cultures.  Electronically signed by Lara Saavedra RN on 12/18/2021 at 2:59 AM

## 2021-12-18 NOTE — ED PROVIDER NOTES
[address]      Pt Name: Olivier Gilbert  MRN: [id number]  [place] 1975  Date of evaluation: 12/17/2021  Provider: Dominique Rosales MD    [address] [place] [address]       Chief Complaint   Patient presents with    Endocarditis     PT dx with endocarditis, was admitted to [place], [place] left [facility] because she felt she was not recieving adequate treatment. PT c/o chest pain, fever, generalized body aches         HISTORY OF PRESENT ILLNESS   (Location/Symptom, Timing/Onset,Context/Setting, Quality, Duration, Modifying Factors, Severity)  Note limiting factors. Olivier Gilbert is a 40 y.o. female who presents to the emergency department for SOB. Pt has been seen in two different facilities in the past 6 days. Pt was dx'd with endocarditis. She was at [place] and checked out [facility]. Pt then came to [place] and was seen at [facility] on 12/16. She then checked out [facility] today after she was supposed to be transferred to [facility] for continuation of her care. Pt tells me that she was going to take herself to [place]. Instead she went home and thought that she would wait until the morning. She then got SOB and had difficulty walking. Pt reports a subjective fever. HPI    NursingNotes were reviewed. REVIEW OF SYSTEMS    (2-9 systems for level 4, 10 or more for level 5)     Review of Systems   Constitutional: Positive for appetite change (Loss), chills, fatigue and fever. Negative for activity change. HENT: Positive for congestion. Negative for ear pain, rhinorrhea, sore throat and trouble swallowing. Eyes: Negative for photophobia, pain and visual disturbance. Respiratory: Positive for cough and shortness of breath. Negative for chest tightness and wheezing. Cardiovascular: Negative for chest pain, palpitations and leg swelling. Gastrointestinal: Positive for abdominal pain and nausea. Negative for abdominal distention, constipation, diarrhea and vomiting. Genitourinary: Negative for difficulty urinating, dysuria, flank pain, urgency, vaginal bleeding and vaginal discharge. Musculoskeletal: Negative for back pain, myalgias and neck stiffness. Skin: Negative for color change, pallor and rash. Neurological: Negative for dizziness, weakness, light-headedness, numbness and headaches. Psychiatric/Behavioral: Negative for agitation, behavioral problems, confusion, hallucinations and suicidal ideas. PAST MEDICALHISTORY     Past Medical History:   Diagnosis Date    CAD (coronary artery disease)     History of blood transfusion     Liver disease          SURGICAL HISTORY       Past Surgical History:   Procedure Laterality Date    CARDIAC SURGERY      open heart     SECTION      LUNG SURGERY Left          CURRENT MEDICATIONS     There are no discharge medications for this patient. ALLERGIES     Patient has no known allergies. FAMILY HISTORY     History reviewed. No pertinent family history. SOCIAL HISTORY       Social History     Socioeconomic History    Marital status: Single     Spouse name: None    Number of children: None    Years of education: None    Highest education level: None   Occupational History    None   Tobacco Use    Smoking status: Current Every Day Smoker     Packs/day: 1.00     Years: 30.00     Pack years: 30.00    Smokeless tobacco: Never Used   Substance and Sexual Activity    Alcohol use: Never    Drug use: Not Currently     Types: Methamphetamines (Crystal Meth), Opiates     Sexual activity: None   Other Topics Concern    None   Social History Narrative    None     Social Determinants of Health     Financial Resource Strain:     Difficulty of Paying Living Expenses: Not on file   Food Insecurity:     Worried About Running Out of Food in the Last Year: Not on file    Ashkan of Food in the Last Year: Not on file   Transportation Needs:     Lack of Transportation (Medical):  Not on file    rub. No gallop. Pulmonary:      Effort: Pulmonary effort is normal.      Breath sounds: Normal breath sounds. No wheezing, rhonchi or rales. Abdominal:      General: Abdomen is flat. Bowel sounds are normal. There is no distension. Palpations: Abdomen is soft. Tenderness: There is no abdominal tenderness. There is no guarding or rebound. Musculoskeletal:         General: No tenderness. Normal range of motion. Cervical back: Normal range of motion and neck supple. Skin:     General: Skin is warm and dry. Capillary Refill: Capillary refill takes less than 2 seconds. Neurological:      General: No focal deficit present. Mental Status: She is alert and oriented to person, place, and time. Cranial Nerves: No cranial nerve deficit. Psychiatric:         Mood and Affect: Mood normal.         Behavior: Behavior normal.         Thought Content: Thought content normal.         DIAGNOSTIC RESULTS     EKG: All EKG's areinterpreted by the Emergency Department Physician who either signs or Co-signs this chart in the absence of a cardiologist.        RADIOLOGY:  Non-plain film images such as CT, Ultrasound and MRI are read by the radiologist. Plain radiographic images are visualized and preliminarily interpreted bythe emergency physician with the below findings:          XR CHEST PORTABLE   Final Result   1. Scattered bilateral pulmonary opacities, some nodular and partially   cavitary in appearance. Findings concerning for multifocal pneumonia   and/or septic emboli. 2. Borderline cardiomegaly with evidence prior mediastinal surgery. .   Signed by Dr Reza Golden:  Labs Reviewed   CBC WITH AUTO DIFFERENTIAL - Abnormal; Notable for the following components:       Result Value    WBC 17.5 (*)     RBC 2.77 (*)     Hemoglobin 7.6 (*)     Hematocrit 22.9 (*)     RDW 17.0 (*)     Platelets 73 (*)     Neutrophils % 92.0 (*)     Lymphocytes % 4.0 (*)     Neutrophils Absolute 16.3 (*)     Lymphocytes Absolute 0.7 (*)     Myelocyte Percent 1 (*)     Toxic Granulation 1+ (*)     Smudge Cells Occasional (*)     Hypersegmented Neutrophils 1+ (*)     Poikilocytes 2+ (*)     Schistocytes Occasional (*)     [place] Cells Occasional (*)     Target Cells 1+ (*)     All other components within normal limits   COMPREHENSIVE METABOLIC PANEL - Abnormal; Notable for the following components:    Sodium 128 (*)     CO2 20 (*)     Glucose 146 (*)     BUN 25 (*)     CREATININE 1.7 (*)     GFR Non- 32 (*)     GFR  39 (*)     Calcium 7.3 (*)     Albumin 1.8 (*)     All other components within normal limits   LACTIC ACID, PLASMA - Abnormal; Notable for the following components:    Lactic Acid 2.6 (*)     All other components within normal limits    Narrative:     CALL  [facility] tel. ,  Chemistry results called to and read back by Chantel Cole RN in ED, 12/17/2021  23:10, by [facility]   CBC WITH AUTO DIFFERENTIAL - Abnormal; Notable for the following components:    WBC 14.9 (*)     RBC 2.75 (*)     Hemoglobin 7.5 (*)     Hematocrit 23.0 (*)     MCHC 32.6 (*)     RDW 17.2 (*)     Platelets 77 (*)     Neutrophils % 85.0 (*)     Lymphocytes % 8.0 (*)     Neutrophils Absolute 12.7 (*)     Toxic Granulation 1+ (*)     Polychromasia 1+ (*)     Poikilocytes 2+ (*)     Schistocytes Occasional (*)     Catalino Cells Occasional (*)     All other components within normal limits    Narrative:     Collection has been rescheduled by [facility] at 12/18/2021 04:12 Reason: Pt   in shower   BASIC METABOLIC PANEL W/ REFLEX TO MG FOR LOW K - Abnormal; Notable for the following components:    Sodium 132 (*)     CO2 19 (*)     BUN 25 (*)     CREATININE 1.9 (*)     GFR Non- 29 (*)     GFR  35 (*)     Calcium 7.2 (*)     All other components within normal limits    Narrative:     Collection has been rescheduled by [facility] at 12/18/2021 04:12 Reason: Pt   in shower   CULTURE, BLOOD 1 CULTURE, BLOOD 2   CULTURE, BLOOD 1   CULTURE, BLOOD 2   C DIFF TOXIN/ANTIGEN   TROPONIN       All other labs were within normal range or not returned as of this dictation. EMERGENCY DEPARTMENT COURSE and DIFFERENTIAL DIAGNOSIS/MDM:   Vitals:    Vitals:    12/18/21 0100 12/18/21 0125 12/18/21 0400 12/18/21 0618   BP: 110/65 123/84 123/84 113/77   Pulse: 101 99 99 104   Resp: 29 18 18 18   Temp: 97.8 °F (36.6 °C) 97.3 °F (36.3 °C) 97 °F (36.1 °C) 97.3 °F (36.3 °C)   TempSrc: Oral Temporal Temporal Temporal   SpO2: 95% 97% 97% 95%   Weight:  258 lb 6.4 oz (117.2 kg)     Height:           MDM  Number of Diagnoses or Management Options  Acute bacterial endocarditis: new and requires workup  Diagnosis management comments: Patient presented for shortness of breath and fatigue. Come to find out, she had already checked herself out [address] twice in the past 6 days from 2 different facilities. She felt like she was going to get better care here. I then got the records from [facility] and found out that she had infective endocarditis with septic emboli. She had already been accepted to [facility] when she checked out [address] on 12/17/2021 at around 1800 hrs. She then went home after telling them that she was going to drive herself to [place]. Instead she went home where she got winded, more fatigued and decided she needed to come back here. I was able to speak to the [address] [facility] surgeon, Dr. Feliz Boast, and he agreed to accept her for transfer again. The problem now, is that the facilities are so full that they are unsure when they are again to be able to transfer the patient. I discussed this with our hospitalist service. He agreed to get her admitted here while we wait for an official transfer. Patient was started on IV antibiotics here in the ED. Once a bed becomes available upstairs she will be placed on the floor.     Patient Progress  Patient progress: stable      Reassessment      Medications   oxyCODONE-acetaminophen (PERCOCET) 5-325 MG per tablet 1 tablet (1 tablet Oral Given 12/18/21 0546)   morphine (PF) injection 2 mg (has no administration in time range)   ondansetron (ZOFRAN) injection 4 mg (has no administration in time range)   vancomycin (VANCOCIN) intermittent dosing (placeholder) (has no administration in time range)   benzonatate (TESSALON) capsule 200 mg (200 mg Oral Given 12/18/21 0546)   acetaminophen (TYLENOL) tablet 650 mg (650 mg Oral Given 12/17/21 2200)   lactated ringers bolus (0 mLs IntraVENous Stopped 12/18/21 0115)   vancomycin (VANCOCIN) 1000 mg in dextrose 5% 250 mL IVPB (0 mg IntraVENous Stopped 12/18/21 0115)   cefTRIAXone (ROCEPHIN) 2,000 mg in sodium chloride (PF) 20 mL IV syringe (2,000 mg IntraVENous Given 12/18/21 0014)   ketorolac (TORADOL) injection 30 mg (30 mg IntraVENous Given 12/18/21 0030)       CONSULTS:  PHARMACY TO DOSE VANCOMYCIN  IP CONSULT TO PSYCHIATRY:  Unless otherwise noted below, none     Procedures    FINAL IMPRESSION      1. Acute bacterial endocarditis          DISPOSITION/PLAN   DISPOSITION Decision To Admit 12/18/2021 07:46:34 AM      PATIENT REFERRED TO:  No follow-up provider specified. DISCHARGE MEDICATIONS:  There are no discharge medications for this patient.          (Please note that portions of this note were completed with a voice recognition program.  Efforts were made to edit thedictations but occasionally words are mis-transcribed.)    Conrad Mason MD (electronically signed)Emergency Physician          Saundra Canales MD  12/18/21 [id number]

## 2021-12-18 NOTE — PROGRESS NOTES
4 Eyes Skin Assessment    Juan Rosenbaum is being assessed upon: Admission    I agree that I, Anibal Vincent RN, along with Sheila Timmons(either 2 RN's or 1 LPN and 1 RN) have performed a thorough Head to Toe Skin Assessment on the patient. ALL assessment sites listed below have been assessed. Areas assessed by both nurses:     [x]   Head, Face, and Ears   [x]   Shoulders, Back, and Chest  [x]   Arms, Elbows, and Hands   [x]   Coccyx, Sacrum, and Ischium  [x]   Legs, Feet, and Heels    Does the Patient have Skin Breakdown?  No    Josiah Prevention initiated: No  Wound Care Orders initiated: No    [facility] nurse consulted for Pressure Injury (Stage 3,4, Unstageable, DTI, NWPT, and Complex wounds) and New or Established Ostomies: No        Primary Nurse eSignature: Anibal Vincent RN on 12/18/2021 at 1:57 AM      Co-Signer eSignature: Electronically signed by Olga Morales RN on 12/18/21 at 3:08 AM CST

## 2021-12-18 NOTE — H&P
Financial Resource Strain:     Difficulty of Paying Living Expenses: Not on file   Food Insecurity:     Worried About Running Out of Food in the Last Year: Not on file    Ashkan of Food in the Last Year: Not on file   Transportation Needs:     Lack of Transportation (Medical): Not on file    Lack of Transportation (Non-Medical): Not on file   Physical Activity:     Days of Exercise per Week: Not on file    Minutes of Exercise per Session: Not on file   Stress:     Feeling of Stress : Not on file   Social Connections:     Frequency of Communication with Friends and Family: Not on file    Frequency of Social Gatherings with Friends and Family: Not on file    Attends Judaism Services: Not on file    Active Member of [address] [place] [address] [employer] or Organizations: Not on file    Attends Club or Organization Meetings: Not on file    Marital Status: Not on file   Intimate Partner Violence:     Fear of Current or Ex-Partner: Not on file    Emotionally Abused: Not on file    Physically Abused: Not on file    Sexually Abused: Not on file   Housing Stability:     Unable to Pay for Housing in the Last Year: Not on file    Number of [place] in the Last Year: Not on file    Unstable Housing in the Last Year: Not on file     No Known Allergies  Prior to Admission medications    Not on File       I have reviewed all pertinent history. Prior medical records and laboratory evaluation reviewed. Imaging independently reviewed. Review of Systems:   As per HPI, otherwise all other ROS performed and found to be negative at this time. Physical Exam:  Vitals:    12/18/21 0125   BP: 123/84   Pulse: 99   Resp: 18   Temp: 97.3 °F (36.3 °C)   SpO2: 97%     CONSTITUTIONAL: Appears ill  PSYCH:  Dysphoric mood  EYES: symmetrical. Conjunctiva are  non-icteric. Lids are normal  ENT: no deformity  NECK: Trachea is midline. HEAD:  NC/AT  LUNGS: Normal respiratory effort, no intercostal retractions or accessory muscle use. CARDIOVASCULAR: Regular rate and rhythm, Pulses are equal bilaterally. GI/ABDOMEN: Soft, non-tender, non-distended, no guarding or rebound. Bowel sounds are normal.   SKIN: Warm and dry.    NEUROLOGICAL:  Alert, follows commands  EXTREMITIES: no clubbing or cyanosis    Labs:   Recent Results (from the past 72 hour(s))   CBC Auto Differential    Collection Time: 12/17/21  9:15 PM   Result Value Ref Range    WBC 17.5 (H) 4.8 - 10.8 K/uL    RBC 2.77 (L) 4.20 - 5.40 M/uL    Hemoglobin 7.6 (L) 12.0 - 16.0 g/dL    Hematocrit 22.9 (L) 37.0 - 47.0 %    MCV 82.7 81.0 - 99.0 fL    MCH 27.4 27.0 - 31.0 pg    MCHC 33.2 33.0 - 37.0 g/dL    RDW 17.0 (H) 11.5 - 14.5 %    Platelets 73 (L) 914 - 400 K/uL    PLATELET SLIDE REVIEW Decreased     Neutrophils % 92.0 (H) 50.0 - 65.0 %    Lymphocytes % 4.0 (L) 20.0 - 40.0 %    Monocytes % 3.0 0.0 - 10.0 %    Eosinophils % 0.0 0.0 - 5.0 %    Basophils % 0.0 0.0 - 1.0 %    Neutrophils Absolute 16.3 (H) 1.5 - 7.5 K/uL    Immature Granulocytes # 0.4 K/uL    Lymphocytes Absolute 0.7 (L) 1.1 - 4.5 K/uL    Monocytes Absolute 0.50 0.00 - 0.90 K/uL    Eosinophils Absolute 0.00 0.00 - 0.60 K/uL    Basophils Absolute 0.00 0.00 - 0.20 K/uL    Myelocyte Percent 1 (A) %    Toxic Granulation 1+ (A)     Smudge Cells Occasional (A)     Hypersegmented Neutrophils 1+ (A)     Poikilocytes 2+ (A)     Schistocytes Occasional (A)     Windber Cells Occasional (A)     Target Cells 1+ (A)    Comprehensive Metabolic Panel    Collection Time: 12/17/21  9:15 PM   Result Value Ref Range    Sodium 128 (L) 136 - 145 mmol/L    Potassium 3.8 3.5 - 5.0 mmol/L    Chloride 98 98 - 111 mmol/L    CO2 20 (L) 22 - 29 mmol/L    Anion Gap 10 7 - 19 mmol/L    Glucose 146 (H) 74 - 109 mg/dL    BUN 25 (H) 6 - 20 mg/dL    CREATININE 1.7 (H) 0.5 - 0.9 mg/dL    GFR Non- 32 (A) >60    GFR  39 (L) >59    Calcium 7.3 (L) 8.6 - 10.0 mg/dL    Total Protein 7.0 6.6 - 8.7 g/dL    Albumin 1.8 (L) 3.5 - 5.2 g/dL Total Bilirubin 0.5 0.2 - 1.2 mg/dL    Alkaline Phosphatase 97 35 - 104 U/L    ALT 10 5 - 33 U/L    AST 25 5 - 32 U/L   Troponin    Collection Time: 12/17/21  9:15 PM   Result Value Ref Range    Troponin <0.01 0.00 - 0.03 ng/mL   Lactic Acid, Plasma    Collection Time: 12/17/21  9:26 PM   Result Value Ref Range    Lactic Acid 2.6 (HH) 0.5 - 1.9 mmol/L       Imaging: XR CHEST PORTABLE    Result Date: 12/17/2021  XR CHEST PORTABLE 12/17/2021 9:46 PM HISTORY: Chest pain COMPARISON: None at this institution CXR: A single frontal view of the chest is performed. Findings: There are scattered bilateral pulmonary opacities, somewhat nodular in appearance. Cavitary changes suspected within several of the right sided opacities. Findings concerning for multifocal pneumonia and/or septic emboli. Borderline cardiomegaly. Prior mediastinal surgery. No pleural effusion or pneumothorax. No acute bony pathology. 1. Scattered bilateral pulmonary opacities, some nodular and partially cavitary in appearance. Findings concerning for multifocal pneumonia and/or septic emboli. 2. Borderline cardiomegaly with evidence prior mediastinal surgery. . Signed by Dr Edward Jacob      Assessment/Plan:     Active Problems:    Endocarditis    Generalized pain    MRSA bacteremia    Drug abuse ([address]  Resolved Problems:    * No resolved hospital problems.  *       Endocarditis, MRSA bacteremia  Dyspnea, likely secondary to septic pulmonary emboli  --Repeat Blood Cultures  --Review recent Echo from outside facility  --Continue Vancomycin  --Consult ID when available    Pain control     Supportive Care    Accepted for transfer to [facility] in [place] pending bed availability, follow up with transfer center      DVT prophylaxis- SCDs, bleed risk with septic emboli    Williams Blair MD  12/18/2021 2:08 AM

## 2021-12-19 ENCOUNTER — APPOINTMENT (OUTPATIENT)
Dept: GENERAL RADIOLOGY | Age: 46
DRG: 871 | End: 2021-12-19
Payer: COMMERCIAL

## 2021-12-19 LAB
ANION GAP SERPL CALCULATED.3IONS-SCNC: 11 MMOL/L (ref 7–19)
BACTERIA SPEC AEROBE CULT: ABNORMAL
BASOPHILS ABSOLUTE: 0.1 K/UL (ref 0–0.2)
BASOPHILS RELATIVE PERCENT: 0.5 % (ref 0–1)
BLOOD CULTURE, ROUTINE: ABNORMAL
BUN BLDV-MCNC: 24 MG/DL (ref 6–20)
CALCIUM SERPL-MCNC: 7.2 MG/DL (ref 8.6–10)
CHLORIDE BLD-SCNC: 101 MMOL/L (ref 98–111)
CO2: 18 MMOL/L (ref 22–29)
CREAT SERPL-MCNC: 1.8 MG/DL (ref 0.5–0.9)
CULTURE, BLOOD 2: ABNORMAL
CULTURE, BLOOD 2: ABNORMAL
EOSINOPHILS ABSOLUTE: 0.2 K/UL (ref 0–0.6)
EOSINOPHILS RELATIVE PERCENT: 1.1 % (ref 0–5)
GFR AFRICAN AMERICAN: 37
GFR NON-AFRICAN AMERICAN: 30
GLUCOSE BLD-MCNC: 111 MG/DL (ref 74–109)
GRAM STN SPEC: ABNORMAL
HCT VFR BLD CALC: 24.3 % (ref 37–47)
HEMOGLOBIN: 7.6 G/DL (ref 12–16)
IMMATURE GRANULOCYTES #: 0.2 K/UL
ISOLATED FROM: ABNORMAL
LYMPHOCYTES ABSOLUTE: 1.4 K/UL (ref 1.1–4.5)
LYMPHOCYTES RELATIVE PERCENT: 9.3 % (ref 20–40)
MCH RBC QN AUTO: 27.7 PG (ref 27–31)
MCHC RBC AUTO-ENTMCNC: 31.3 G/DL (ref 33–37)
MCV RBC AUTO: 88.7 FL (ref 81–99)
MONOCYTES ABSOLUTE: 1.4 K/UL (ref 0–0.9)
MONOCYTES RELATIVE PERCENT: 8.9 % (ref 0–10)
NEUTROPHILS ABSOLUTE: 12 K/UL (ref 1.5–7.5)
NEUTROPHILS RELATIVE PERCENT: 79 % (ref 50–65)
ORGANISM: ABNORMAL
PDW BLD-RTO: 17.9 % (ref 11.5–14.5)
PLATELET # BLD: 155 K/UL (ref 130–400)
PMV BLD AUTO: 11.3 FL (ref 9.4–12.3)
POTASSIUM REFLEX MAGNESIUM: 4.1 MMOL/L (ref 3.5–5)
RBC # BLD: 2.74 M/UL (ref 4.2–5.4)
SODIUM BLD-SCNC: 130 MMOL/L (ref 136–145)
VANCOMYCIN TROUGH: 11.3 UG/ML (ref 10–20)
WBC # BLD: 15.2 K/UL (ref 4.8–10.8)

## 2021-12-19 PROCEDURE — 6360000002 HC RX W HCPCS: Performed by: STUDENT IN AN ORGANIZED HEALTH CARE EDUCATION/TRAINING PROGRAM

## 2021-12-19 PROCEDURE — 2700000000 HC OXYGEN THERAPY PER DAY

## 2021-12-19 PROCEDURE — 6370000000 HC RX 637 (ALT 250 FOR IP): Performed by: STUDENT IN AN ORGANIZED HEALTH CARE EDUCATION/TRAINING PROGRAM

## 2021-12-19 PROCEDURE — 94640 AIRWAY INHALATION TREATMENT: CPT

## 2021-12-19 PROCEDURE — 71045 X-RAY EXAM CHEST 1 VIEW: CPT

## 2021-12-19 PROCEDURE — 2140000000 HC CCU INTERMEDIATE R&B

## 2021-12-19 PROCEDURE — 36415 COLL VENOUS BLD VENIPUNCTURE: CPT

## 2021-12-19 PROCEDURE — 80048 BASIC METABOLIC PNL TOTAL CA: CPT

## 2021-12-19 PROCEDURE — 85025 COMPLETE CBC W/AUTO DIFF WBC: CPT

## 2021-12-19 PROCEDURE — 80202 ASSAY OF VANCOMYCIN: CPT

## 2021-12-19 PROCEDURE — 2580000003 HC RX 258: Performed by: STUDENT IN AN ORGANIZED HEALTH CARE EDUCATION/TRAINING PROGRAM

## 2021-12-19 RX ORDER — IPRATROPIUM BROMIDE AND ALBUTEROL SULFATE 2.5; .5 MG/3ML; MG/3ML
1 SOLUTION RESPIRATORY (INHALATION) EVERY 4 HOURS PRN
Status: DISCONTINUED | OUTPATIENT
Start: 2021-12-19 | End: 2021-12-20 | Stop reason: HOSPADM

## 2021-12-19 RX ORDER — HYDROMORPHONE HYDROCHLORIDE 1 MG/ML
0.5 INJECTION, SOLUTION INTRAMUSCULAR; INTRAVENOUS; SUBCUTANEOUS EVERY 12 HOURS PRN
Status: DISCONTINUED | OUTPATIENT
Start: 2021-12-19 | End: 2021-12-20 | Stop reason: HOSPADM

## 2021-12-19 RX ADMIN — ENOXAPARIN SODIUM 40 MG: 100 INJECTION SUBCUTANEOUS at 13:56

## 2021-12-19 RX ADMIN — IPRATROPIUM BROMIDE AND ALBUTEROL SULFATE 1 AMPULE: .5; 3 SOLUTION RESPIRATORY (INHALATION) at 16:00

## 2021-12-19 RX ADMIN — OXYCODONE HYDROCHLORIDE AND ACETAMINOPHEN 1 TABLET: 5; 325 TABLET ORAL at 18:00

## 2021-12-19 RX ADMIN — HYDROMORPHONE HYDROCHLORIDE 0.5 MG: 1 INJECTION, SOLUTION INTRAMUSCULAR; INTRAVENOUS; SUBCUTANEOUS at 13:58

## 2021-12-19 RX ADMIN — LORAZEPAM 0.5 MG: 0.5 TABLET ORAL at 20:49

## 2021-12-19 RX ADMIN — LORAZEPAM 0.5 MG: 0.5 TABLET ORAL at 12:54

## 2021-12-19 RX ADMIN — OXYCODONE HYDROCHLORIDE AND ACETAMINOPHEN 1 TABLET: 5; 325 TABLET ORAL at 12:54

## 2021-12-19 RX ADMIN — VANCOMYCIN HYDROCHLORIDE 1250 MG: 10 INJECTION, POWDER, LYOPHILIZED, FOR SOLUTION INTRAVENOUS at 13:57

## 2021-12-19 RX ADMIN — OXYCODONE HYDROCHLORIDE AND ACETAMINOPHEN 1 TABLET: 5; 325 TABLET ORAL at 03:21

## 2021-12-19 RX ADMIN — OXYCODONE HYDROCHLORIDE AND ACETAMINOPHEN 1 TABLET: 5; 325 TABLET ORAL at 07:48

## 2021-12-19 ASSESSMENT — PAIN DESCRIPTION - LOCATION
LOCATION: GENERALIZED

## 2021-12-19 ASSESSMENT — PAIN SCALES - GENERAL
PAINLEVEL_OUTOF10: 0
PAINLEVEL_OUTOF10: 2
PAINLEVEL_OUTOF10: 9
PAINLEVEL_OUTOF10: 8
PAINLEVEL_OUTOF10: 10
PAINLEVEL_OUTOF10: 9
PAINLEVEL_OUTOF10: 10

## 2021-12-19 ASSESSMENT — PAIN SCALES - WONG BAKER
WONGBAKER_NUMERICALRESPONSE: 0
WONGBAKER_NUMERICALRESPONSE: 2

## 2021-12-19 NOTE — PROGRESS NOTES
[facility] access cr called and they received an update on pts condition. They also mentioned they still don't have a bed available. And they want to be notified if there is any significant change on pts condition.    Electronically signed by Dominique Sorto RN on 12/19/2021 at 4:29 AM

## 2021-12-19 NOTE — PROGRESS NOTES
[facility]        Hospitalist Progress Note  12/19/2021 12:37 PM  Subjective:   Admit Date: 12/17/2021  PCP: No primary care provider on file. Subjective: Patient was seen and examined by the bedside this morning. She continues to complain of generalized body pain. She has no fever, chills, nausea, vomiting. Cumulative Hospital History:   Patient is a 70-year-old female with medical history significant for polysubstance abuse, MRSA bacteremia and infective endocarditis was admitted on account of chest discomfort and untreated infective endocarditis. ROS: 14 point review of systems is negative except as specifically addressed above. ADULT DIET;  Regular; Low Fat/Low Chol/High Fiber/SHANTEL    Intake/Output Summary (Last 24 hours) at 12/19/2021 1237  Last data filed at 12/19/2021 1044  Gross per 24 hour   Intake 360 ml   Output 850 ml   Net -490 ml     Medications:  Current Facility-Administered Medications   Medication Dose Route Frequency Provider Last Rate Last Admin    vancomycin (VANCOCIN) 1,250 mg in dextrose 5 % 250 mL IVPB  1,250 mg IntraVENous Once Clarice Buck MD        enoxaparin (LOVENOX) injection 40 mg  40 mg SubCUTAneous Daily Shawn Frye MD        HYDROmorphone HCl PF (DILAUDID) injection 0.5 mg  0.5 mg IntraVENous Q12H PRN Shawn Frye MD        oxyCODONE-acetaminophen (PERCOCET) 5-325 MG per tablet 1 tablet  1 tablet Oral Q4H PRN Clarice Buck MD   1 tablet at 12/19/21 0748    morphine (PF) injection 2 mg  2 mg IntraVENous Q3H PRN Clarice Buck MD   2 mg at 12/18/21 2004    ondansetron [facility]) injection 4 mg  4 mg IntraVENous Q6H PRN Clarice Buck MD        vancomycin Ashley Kays) intermittent dosing (placeholder)   Other RX Placeholder Clarice Buck MD        benzonatate (TESSALON) capsule 200 mg  200 mg Oral TID PRN Clarice Buck MD   200 mg at 12/18/21 1155    acetaminophen (TYLENOL) tablet 650 mg  650 mg Oral Q4H PRN Shawn Frye MD 650 mg at 12/18/21 1939    nicotine (NICODERM CQ) 14 MG/24HR 1 patch  1 patch TransDERmal Daily Stalin Cardoza MD   1 patch at 12/18/21 1240    LORazepam (ATIVAN) tablet 0.5 mg  0.5 mg Oral Q6H PRN Stalin Cardoza MD   0.5 mg at 12/18/21 1240        Labs:     Recent Labs     12/17/21 2115 12/18/21 0443 12/19/21  0809   WBC 17.5* 14.9* 15.2*   RBC 2.77* 2.75* 2.74*   HGB 7.6* 7.5* 7.6*   HCT 22.9* 23.0* 24.3*   MCV 82.7 83.6 88.7   MCH 27.4 27.3 27.7   MCHC 33.2 32.6* 31.3*   PLT 73* 77* 155     Recent Labs     12/17/21 2115 12/18/21 0443 12/19/21  0809   * 132* 130*   K 3.8 3.7 4.1   ANIONGAP 10 12 11   CL 98 101 101   CO2 20* 19* 18*   BUN 25* 25* 24*   CREATININE 1.7* 1.9* 1.8*   GLUCOSE 146* 109 111*   CALCIUM 7.3* 7.2* 7.2*     No results for input(s): MG, PHOS in the last 72 hours. Recent Labs     12/17/21 2115   AST 25   ALT 10   BILITOT 0.5   ALKPHOS 97     ABGs:No results for input(s): PH, PO2, PCO2, HCO3, BE, O2SAT in the last 72 hours. Troponin T:   Recent Labs     12/17/21 2115   Tika Dominguez <0.01     INR: No results for input(s): INR in the last 72 hours. Lactic Acid:   Recent Labs     12/17/21 2126   LACTA 2.6*       Objective:   Vitals: /76   Pulse 103   Temp 98.4 °F (36.9 °C) (Temporal)   Resp 20   Ht 5' 5\" (1.651 m)   Wt 258 lb 6.4 oz (117.2 kg)   LMP 12/05/2021   SpO2 98%   BMI 43.00 kg/m²   24HR INTAKE/OUTPUT:      Intake/Output Summary (Last 24 hours) at 12/19/2021 1237  Last data filed at 12/19/2021 1044  Gross per 24 hour   Intake 360 ml   Output 850 ml   Net -490 ml     General appearance: Agitated young adult obese female seen lying down in no acute cardiopulmonary distress.   Alert and cooperative with exam  HEENT: atraumatic, eyes with clear conjunctiva and normal lids, pupils and irises normal, external ears and nose are normal, lips normal  Lungs: no increased work of breathing, CTA B  Heart: Tachycardic, S1-S2 heard, no m,r,g  Abdomen: Soft, NT, ND, BS +  Extremities: No cyanosis, no edema. Peripheral pulses present  Neurologic: Alert and oriented, affect and mood appropriate  Skin: no rashes, nodules    Assessment and Plan: Active Problems:    Endocarditis    Generalized pain    MRSA bacteremia    Drug abuse ([address]  Resolved Problems:    * No resolved hospital problems.  *    Plan  #Infective endocarditis  #MRSA bacteremia  #Noncompliance with medical therapy  #History of polysubstance abuse  -Patient counseled extensively on cessation of drugs of abuse  -Continue vancomycin   -Continue as needed pain meds  -Patient is awaiting placement at [facility]    #Other comorbidities-continue home meds      Advance Directive: Full Code    DVT prophylaxis: Lovenox    Discharge planning: Patient is awaiting placement at [facility]      Signed:  Cristina Campos MD 12/19/2021 12:37 PM  Rounding Hospitalist

## 2021-12-19 NOTE — PROGRESS NOTES
Hospitalist notified of pts positive blood cutures.      Electronically signed by Bonnie Mitchell RN on 12/19/2021 at 4:29 AM

## 2021-12-19 NOTE — PROGRESS NOTES
Pt refused morning labs to be drawn. Will attempt to draw at a later time.     Electronically signed by Mily Blevins RN on 12/19/2021 at 4:31 AM

## 2021-12-20 VITALS
TEMPERATURE: 99.4 F | SYSTOLIC BLOOD PRESSURE: 137 MMHG | RESPIRATION RATE: 18 BRPM | OXYGEN SATURATION: 97 % | BODY MASS INDEX: 42.49 KG/M2 | HEART RATE: 110 BPM | HEIGHT: 65 IN | WEIGHT: 255 LBS | DIASTOLIC BLOOD PRESSURE: 89 MMHG

## 2021-12-20 LAB
ANION GAP SERPL CALCULATED.3IONS-SCNC: 10 MMOL/L (ref 7–19)
BASOPHILS ABSOLUTE: 0.1 K/UL (ref 0–0.2)
BASOPHILS RELATIVE PERCENT: 0.6 % (ref 0–1)
BH BB BLOOD EXPIRATION DATE: NORMAL
BH BB BLOOD TYPE BARCODE: 5100
BH BB DISPENSE STATUS: NORMAL
BH BB PRODUCT CODE: NORMAL
BH BB UNIT NUMBER: NORMAL
BUN BLDV-MCNC: 24 MG/DL (ref 6–20)
C DIFF TOXIN/ANTIGEN: NORMAL
CALCIUM SERPL-MCNC: 7.3 MG/DL (ref 8.6–10)
CHLORIDE BLD-SCNC: 100 MMOL/L (ref 98–111)
CO2: 22 MMOL/L (ref 22–29)
CREAT SERPL-MCNC: 1.7 MG/DL (ref 0.5–0.9)
CROSSMATCH INTERPRETATION: NORMAL
CULTURE, BLOOD 2: ABNORMAL
CULTURE, BLOOD 2: ABNORMAL
EKG P AXIS: 44 DEGREES
EKG P-R INTERVAL: 100 MS
EKG Q-T INTERVAL: 328 MS
EKG QRS DURATION: 92 MS
EKG QTC CALCULATION (BAZETT): 441 MS
EKG T AXIS: 7 DEGREES
EOSINOPHILS ABSOLUTE: 0.1 K/UL (ref 0–0.6)
EOSINOPHILS RELATIVE PERCENT: 1 % (ref 0–5)
GFR AFRICAN AMERICAN: 39
GFR NON-AFRICAN AMERICAN: 32
GLUCOSE BLD-MCNC: 101 MG/DL (ref 74–109)
HCT VFR BLD CALC: 23.9 % (ref 37–47)
HEMOGLOBIN: 7.4 G/DL (ref 12–16)
IMMATURE GRANULOCYTES #: 0.3 K/UL
LYMPHOCYTES ABSOLUTE: 1.9 K/UL (ref 1.1–4.5)
LYMPHOCYTES RELATIVE PERCENT: 13 % (ref 20–40)
MCH RBC QN AUTO: 26.5 PG (ref 27–31)
MCHC RBC AUTO-ENTMCNC: 31 G/DL (ref 33–37)
MCV RBC AUTO: 85.7 FL (ref 81–99)
MONOCYTES ABSOLUTE: 1.4 K/UL (ref 0–0.9)
MONOCYTES RELATIVE PERCENT: 9.6 % (ref 0–10)
NEUTROPHILS ABSOLUTE: 10.6 K/UL (ref 1.5–7.5)
NEUTROPHILS RELATIVE PERCENT: 73.6 % (ref 50–65)
ORGANISM: ABNORMAL
PDW BLD-RTO: 17.2 % (ref 11.5–14.5)
PLATELET # BLD: 194 K/UL (ref 130–400)
PMV BLD AUTO: 10.9 FL (ref 9.4–12.3)
POTASSIUM REFLEX MAGNESIUM: 4.3 MMOL/L (ref 3.5–5)
RBC # BLD: 2.79 M/UL (ref 4.2–5.4)
SODIUM BLD-SCNC: 132 MMOL/L (ref 136–145)
UNIT  ABO: NORMAL
UNIT  RH: NORMAL
VANCOMYCIN RANDOM: 13.7 UG/ML
WBC # BLD: 14.3 K/UL (ref 4.8–10.8)

## 2021-12-20 PROCEDURE — 6360000002 HC RX W HCPCS: Performed by: STUDENT IN AN ORGANIZED HEALTH CARE EDUCATION/TRAINING PROGRAM

## 2021-12-20 PROCEDURE — 2700000000 HC OXYGEN THERAPY PER DAY

## 2021-12-20 PROCEDURE — 6370000000 HC RX 637 (ALT 250 FOR IP): Performed by: STUDENT IN AN ORGANIZED HEALTH CARE EDUCATION/TRAINING PROGRAM

## 2021-12-20 PROCEDURE — 80048 BASIC METABOLIC PNL TOTAL CA: CPT

## 2021-12-20 PROCEDURE — 87324 CLOSTRIDIUM AG IA: CPT

## 2021-12-20 PROCEDURE — 87449 NOS EACH ORGANISM AG IA: CPT

## 2021-12-20 PROCEDURE — 85025 COMPLETE CBC W/AUTO DIFF WBC: CPT

## 2021-12-20 PROCEDURE — 36415 COLL VENOUS BLD VENIPUNCTURE: CPT

## 2021-12-20 PROCEDURE — 80202 ASSAY OF VANCOMYCIN: CPT

## 2021-12-20 RX ADMIN — ACETAMINOPHEN 650 MG: 325 TABLET ORAL at 01:06

## 2021-12-20 RX ADMIN — MORPHINE SULFATE 2 MG: 2 INJECTION, SOLUTION INTRAMUSCULAR; INTRAVENOUS at 00:47

## 2021-12-20 RX ADMIN — ENOXAPARIN SODIUM 40 MG: 100 INJECTION SUBCUTANEOUS at 08:31

## 2021-12-20 RX ADMIN — HYDROMORPHONE HYDROCHLORIDE 0.5 MG: 1 INJECTION, SOLUTION INTRAMUSCULAR; INTRAVENOUS; SUBCUTANEOUS at 08:31

## 2021-12-20 RX ADMIN — OXYCODONE HYDROCHLORIDE AND ACETAMINOPHEN 1 TABLET: 5; 325 TABLET ORAL at 00:00

## 2021-12-20 ASSESSMENT — PAIN SCALES - GENERAL
PAINLEVEL_OUTOF10: 8
PAINLEVEL_OUTOF10: 10
PAINLEVEL_OUTOF10: 10

## 2021-12-20 ASSESSMENT — PAIN DESCRIPTION - LOCATION
LOCATION: GENERALIZED
LOCATION: GENERALIZED

## 2021-12-20 NOTE — PROGRESS NOTES
Attempted to return phone call to patient's brother (with patient's permission) at [phone]. No answer and voice mail not set up. Electronically signed by Daniel Miner RN on 12/19/2021 at 6:34 PM   Patient's brother Cesar Postal returned missed call, and was given update on status of patient transfer to [place].    Electronically signed by Daniel Miner RN on 12/19/2021 at 7:40 PM

## 2021-12-20 NOTE — PROGRESS NOTES
Patient is calm and resting at this time. Temporal temp of 100.5 and worsening audible wheezes and tachypnea noted. Have notified Dr Garlin Boeck. See orders for CXR and neb tx.    Electronically signed by Mauro Kaufman RN on 12/19/2021 at 7:58 PM

## 2021-12-20 NOTE — PROGRESS NOTES
Afsaneh Arvizu at [facility] access line called to get update on patient and informed nurse that there is still no bed available at this time.    Electronically signed by Delio Horta RN on 12/19/2021 at 7:56 PM

## 2021-12-20 NOTE — PROGRESS NOTES
Patient tells me she was using meth and heroin. Spoke at length about her needing to quit. The infection in her blood could kill her if she doesn't get treatment for it. She agrees that she needs to stay.

## 2021-12-20 NOTE — PROGRESS NOTES
Report called to Dariel Foster RN at [facility] in [place], [place]. Phone number #[phone]. Patient will be going to bed 3313. Patient has updated her family herself.      Electronically signed by Susann Bamberger, RN on 12/20/2021 at 9:52 AM

## 2021-12-20 NOTE — PROGRESS NOTES
Have called and spoken with Sudheer Jacobs at [facility] ([phone], [phone]) and given her update on patient status (elevated temp, worsening wheezing, CXR results) from this afternoon.    Electronically signed by Milena Douglas RN on 12/19/2021 at 6:43 PM

## 2021-12-20 NOTE — PROGRESS NOTES
Pharmacy Vancomycin Consult     Vancomycin Day: 3  Current Dosing: Pulse dosing    Temp max:  101.5    Recent Labs     12/19/21  0809 12/20/21  0611   BUN 24* 24*       Recent Labs     12/19/21  0809 12/20/21  0611   CREATININE 1.8* 1.7*       Recent Labs     12/19/21  0809 12/20/21  0611   WBC 15.2* 14.3*         Intake/Output Summary (Last 24 hours) at 12/20/2021 0750  Last data filed at 12/20/2021 8142  Gross per 24 hour   Intake 371 ml   Output 1050 ml   Net -679 ml       Culture Date Source Results   12/17/21 Blood x 2 MRSA   12/18/21 Blood x 2 MRSA       Ht Readings from Last 1 Encounters:   12/17/21 5' 5\" (1.651 m)        Wt Readings from Last 1 Encounters:   12/20/21 255 lb (115.7 kg)         Body mass index is 42.43 kg/m². Estimated Creatinine Clearance: 53 mL/min (A) (based on SCr of 1.7 mg/dL (H)). Random:  13.7    Assessment/Plan: Level therapeutic. Give Vancomycin 1250 mg IV x 1. Draw Vancomycin random level 24 hours from dose. Thank you for the consult. Will continue to follow.      Electronically signed by NEVAEH To [facility] on 12/20/2021 at 7:50 AM

## 2021-12-20 NOTE — DISCHARGE SUMMARY
Discharge Summary    NAME: Elif Adhikari  :  1975  MRN:  [id number]    Admit date:  2021  Discharge date:      Admitting Physician:  Dolores Jeans, MD    Advance Directive: Full Code    Consults: psychiatry    Primary Care Physician:  No primary care provider on file. Discharge Diagnoses: Active Problems:    Endocarditis    Generalized pain    MRSA bacteremia    Drug abuse ([address]  Resolved Problems:    * No resolved hospital problems. *      Significant Diagnostic Studies:   XR CHEST PORTABLE    Result Date: 2021  XR CHEST PORTABLE 2021 9:46 PM HISTORY: Chest pain COMPARISON: None at this institution CXR: A single frontal view of the chest is performed. Findings: There are scattered bilateral pulmonary opacities, somewhat nodular in appearance. Cavitary changes suspected within several of the right sided opacities. Findings concerning for multifocal pneumonia and/or septic emboli. Borderline cardiomegaly. Prior mediastinal surgery. No pleural effusion or pneumothorax. No acute bony pathology. 1. Scattered bilateral pulmonary opacities, some nodular and partially cavitary in appearance. Findings concerning for multifocal pneumonia and/or septic emboli. 2. Borderline cardiomegaly with evidence prior mediastinal surgery. . Signed by Dr Johnna Harvey:   CBC:   Recent Labs     21  0443 21  0809 21  0611   WBC 14.9* 15.2* 14.3*   HGB 7.5* 7.6* 7.4*   PLT 77* 155 194     BMP:    Recent Labs     21  0443 21  0809 21  0611   * 130* 132*   K 3.7 4.1 4.3    101 100   CO2 19* 18* 22   BUN 25* 24* 24*   CREATININE 1.9* 1.8* 1.7*   GLUCOSE 109 111* 101     INR: No results for input(s): INR in the last 72 hours. Lipids: No results for input(s): CHOL, HDL in the last 72 hours.     Invalid input(s): LDLCALCU  ABGs:No results for input(s): PHART, HNM7OIY, PO2ART, YCU7QGX, BEART, HGBAE, L1CPSTET, CARBOXHGBART, 02THERAPY in the last 72 hours. HgBA1c:  No results for input(s): LABA1C in the last 72 hours. Procedures:   Hospital Course:   Patient is a 23-year-old female with medical history significant for polysubstance abuse, MRSA bacteremia and infective endocarditis was admitted on account of chest discomfort and untreated infective endocarditis. She had recently left AMA from [facility] and [facility] where she was been managed for similar presentation. On admission, labs were significant for hyponatremia, elevated WBC, neutrophils, lactic acid, BUN and creatinine. Patient was started on vancomycin, as needed pain meds. Transfer to [facility] was initiated. Patient remained clinically stable while on admission. She is being transferred to [facility] as recommended by CT surgery at [facility].      Physical Exam:  Vital Signs: /88   Pulse 106   Temp 97.2 °F (36.2 °C) (Temporal)   Resp 16   Ht 5' 5\" (1.651 m)   Wt 255 lb (115.7 kg)   LMP 12/05/2021   SpO2 96%   BMI 42.43 kg/m²   General appearance:. Acutely ill looking obese  female seen lying down in no acute cardiopulmonary distress. Alert and Cooperative   HEENT: Normocephalic. Chest: clear to auscultation bilaterally without wheezes or rhonchi. Cardiac: Normal heart tones with regular rate and rhythm, S1, S2 normal. No murmurs, gallops, or rubs auscultated. Abdomen:soft, non-tender; normal bowel sounds, no masses, no organomegaly. Extremities: No clubbing or cyanosis. No peripheral edema. Peripheral pulses palpable. Neurologic: Grossly intact. Discharge Medications:         Medication List      You have not been prescribed any medications. Discharge Instructions:     Diet: ADULT DIET; Regular; Low Fat/Low Chol/High Fiber/SHANTEL     Disposition: Patient is medically stable and will be transferred to [place], [place].     Time spent on discharge-37 minutes    Signed:  Cristina Campos MD  12/20/2021 8:19 AM

## 2021-12-20 NOTE — PROGRESS NOTES
Patient extremely agitated, cussing loudly, complaining about pain and perceived lack of attention. Threatening to leave AMA. Dr Kami Tapia spoke with patient at bedside. Patient agreed to remain at hospital and comply with treatment at this time.    Electronically signed by Denzel Rodriguez RN on 12/19/2021 at 7:55 PM

## 2021-12-23 NOTE — PROGRESS NOTES
Physician Progress Note      PATIENT:               Monika Camacho  CSN #:                  [id number]  :                       1975  ADMIT DATE:       2021 9:12 PM  Rito Casanova DATE:        2021 10:26 AM  RESPONDING  PROVIDER #:        Daria Romero TEXT:    Pt admitted with Endocarditis and MRSA bacteremia. Pt noted to have elevated   WBC, elevated Lactic, elevated heartrate and temperature, . If possible,   please document in the progress notes and discharge summary if you are   evaluating and /or treating any of the following: The medical record reflects the following:  Risk Factors: Endocarditis, MRSA Bacteremia  Clinical Indicators: WBC: 17.5, Lactic 2.6, TEMP: 101.5 and HR: 110-117 in the   ER on arrival,  Treatment: 1 liter LR bolus on arrival, Vancomycin and Rocephin on arrival to   the ER    Thank you in advance,    Lili Estevez, RN-BSN, [facility]  Clinical   [facility]  Gregory Easley  [email or web address]  Options provided:  -- Sepsis, present on admission  -- Sepsis, present on admission, now resolved  -- MRSA Bacteremia without Sepsis  -- SIRS  -- Other - I will add my own diagnosis  -- Disagree - Not applicable / Not valid  -- Disagree - Clinically unable to determine / Unknown  -- Refer to Clinical Documentation Reviewer    PROVIDER RESPONSE TEXT:    This patient has sepsis which was present on admission.     Query created by: Trisha Cross on 2021 8:51 AM      Electronically signed by:  Randy Jernigan 2021 7:40 PM

## 2022-03-10 ENCOUNTER — OFFICE VISIT (OUTPATIENT)
Dept: FAMILY MEDICINE CLINIC | Facility: CLINIC | Age: 47
End: 2022-03-10

## 2022-03-10 VITALS
OXYGEN SATURATION: 98 % | HEIGHT: 65 IN | BODY MASS INDEX: 36.49 KG/M2 | HEART RATE: 116 BPM | WEIGHT: 219 LBS | SYSTOLIC BLOOD PRESSURE: 126 MMHG | DIASTOLIC BLOOD PRESSURE: 98 MMHG

## 2022-03-10 DIAGNOSIS — Z71.6 ENCOUNTER FOR SMOKING CESSATION COUNSELING: ICD-10-CM

## 2022-03-10 DIAGNOSIS — R60.0 LOWER EXTREMITY EDEMA: ICD-10-CM

## 2022-03-10 DIAGNOSIS — Z72.0 TOBACCO ABUSE: ICD-10-CM

## 2022-03-10 DIAGNOSIS — Z13.220 LIPID SCREENING: ICD-10-CM

## 2022-03-10 DIAGNOSIS — E66.9 OBESITY (BMI 30-39.9): ICD-10-CM

## 2022-03-10 DIAGNOSIS — I10 PRIMARY HYPERTENSION: ICD-10-CM

## 2022-03-10 DIAGNOSIS — Z00.00 ANNUAL PHYSICAL EXAM: Primary | ICD-10-CM

## 2022-03-10 DIAGNOSIS — Z86.19 HISTORY OF HEPATITIS C: ICD-10-CM

## 2022-03-10 DIAGNOSIS — R06.09 DYSPNEA ON EXERTION: ICD-10-CM

## 2022-03-10 PROBLEM — N18.6 ESRD ON HEMODIALYSIS: Status: ACTIVE | Noted: 2022-01-29

## 2022-03-10 PROBLEM — A53.9 SYPHILIS: Status: ACTIVE | Noted: 2022-01-29

## 2022-03-10 PROBLEM — Z99.2 ESRD ON HEMODIALYSIS: Status: ACTIVE | Noted: 2022-01-29

## 2022-03-10 PROBLEM — R52 GENERALIZED PAIN: Status: ACTIVE | Noted: 2021-12-18

## 2022-03-10 PROBLEM — E87.1 HYPONATREMIA: Status: ACTIVE | Noted: 2022-01-29

## 2022-03-10 PROBLEM — J94.2 HEMOTHORAX ON LEFT: Status: ACTIVE | Noted: 2019-04-15

## 2022-03-10 PROBLEM — J18.9 MULTIFOCAL PNEUMONIA: Status: ACTIVE | Noted: 2019-04-05

## 2022-03-10 PROBLEM — D64.9 ANEMIA: Status: ACTIVE | Noted: 2022-01-07

## 2022-03-10 PROBLEM — R13.10 DYSPHAGIA: Status: ACTIVE | Noted: 2019-04-05

## 2022-03-10 PROBLEM — A41.9 SEPSIS: Status: ACTIVE | Noted: 2019-04-05

## 2022-03-10 PROBLEM — R78.81 BACTEREMIA: Status: ACTIVE | Noted: 2019-04-08

## 2022-03-10 PROBLEM — I33.0 TRICUSPID VALVE VEGETATION: Status: ACTIVE | Noted: 2019-04-16

## 2022-03-10 PROBLEM — T50.905A THROMBOCYTOPENIA DUE TO DRUGS: Status: ACTIVE | Noted: 2019-04-07

## 2022-03-10 PROBLEM — R79.89 ELEVATED LFTS: Status: ACTIVE | Noted: 2019-05-04

## 2022-03-10 PROBLEM — I26.90 SEPTIC PULMONARY EMBOLISM: Status: ACTIVE | Noted: 2019-04-05

## 2022-03-10 PROBLEM — B95.62 MRSA BACTEREMIA: Status: ACTIVE | Noted: 2021-12-18

## 2022-03-10 PROBLEM — I07.9 ENDOCARDITIS OF TRICUSPID VALVE: Status: ACTIVE | Noted: 2021-12-20

## 2022-03-10 PROBLEM — Z98.890 S/P TRICUSPID VALVE REPAIR: Status: ACTIVE | Noted: 2019-05-07

## 2022-03-10 PROBLEM — F19.10 DRUG ABUSE (HCC): Status: ACTIVE | Noted: 2019-04-05

## 2022-03-10 PROBLEM — J90 LOCULATED PLEURAL EFFUSION: Status: ACTIVE | Noted: 2019-04-15

## 2022-03-10 PROBLEM — D50.9 IRON DEFICIENCY ANEMIA: Status: ACTIVE | Noted: 2019-04-07

## 2022-03-10 PROBLEM — I38 ENDOCARDITIS: Status: ACTIVE | Noted: 2021-12-18

## 2022-03-10 PROBLEM — J96.02 ACUTE RESPIRATORY FAILURE WITH HYPOXIA AND HYPERCAPNIA (HCC): Status: ACTIVE | Noted: 2019-04-05

## 2022-03-10 PROBLEM — J96.01 ACUTE RESPIRATORY FAILURE WITH HYPOXIA AND HYPERCAPNIA: Status: ACTIVE | Noted: 2019-04-05

## 2022-03-10 PROBLEM — K02.9 DENTAL CARIES: Status: ACTIVE | Noted: 2022-01-29

## 2022-03-10 PROBLEM — I49.3 PVC'S (PREMATURE VENTRICULAR CONTRACTIONS): Status: ACTIVE | Noted: 2019-04-08

## 2022-03-10 PROBLEM — D69.59 THROMBOCYTOPENIA DUE TO DRUGS: Status: ACTIVE | Noted: 2019-04-07

## 2022-03-10 PROBLEM — R00.0 SINUS TACHYCARDIA: Status: ACTIVE | Noted: 2019-04-08

## 2022-03-10 PROBLEM — R78.81 MRSA BACTEREMIA: Status: ACTIVE | Noted: 2021-12-18

## 2022-03-10 PROBLEM — I07.1 TRICUSPID REGURGITATION: Status: ACTIVE | Noted: 2019-05-04

## 2022-03-10 PROCEDURE — 71046 X-RAY EXAM CHEST 2 VIEWS: CPT

## 2022-03-10 PROCEDURE — 99204 OFFICE O/P NEW MOD 45 MIN: CPT

## 2022-03-10 PROCEDURE — 93000 ELECTROCARDIOGRAM COMPLETE: CPT

## 2022-03-10 RX ORDER — POLYETHYLENE GLYCOL 3350 17 G
4 POWDER IN PACKET (EA) ORAL AS NEEDED
Qty: 108 EACH | Refills: 0 | Status: SHIPPED | OUTPATIENT
Start: 2022-03-10

## 2022-03-10 RX ORDER — ASPIRIN 81 MG/1
TABLET, COATED ORAL
COMMUNITY
Start: 2022-01-29

## 2022-03-10 RX ORDER — NICOTINE 21 MG/24HR
1 PATCH, TRANSDERMAL 24 HOURS TRANSDERMAL EVERY 24 HOURS
Qty: 28 EACH | Refills: 0 | Status: SHIPPED | OUTPATIENT
Start: 2022-03-10

## 2022-03-10 RX ORDER — FUROSEMIDE 20 MG/1
20 TABLET ORAL DAILY
Qty: 5 TABLET | Refills: 0 | Status: SHIPPED | OUTPATIENT
Start: 2022-03-10 | End: 2022-03-15

## 2022-03-10 RX ORDER — HYDROXYZINE HYDROCHLORIDE 25 MG/1
TABLET, FILM COATED ORAL
COMMUNITY
Start: 2022-01-29 | End: 2022-03-10

## 2022-03-10 RX ORDER — MIDODRINE HYDROCHLORIDE 5 MG/1
5 TABLET ORAL 3 TIMES DAILY
COMMUNITY
Start: 2022-01-29 | End: 2022-03-10

## 2022-03-10 RX ORDER — CALCIUM ACETATE 667 MG/1
667 CAPSULE ORAL
COMMUNITY
Start: 2022-02-28

## 2022-03-10 RX ORDER — LINEZOLID 600 MG/1
TABLET, FILM COATED ORAL
COMMUNITY
Start: 2022-01-29 | End: 2022-03-10

## 2022-03-10 NOTE — PROGRESS NOTES
Subjective   Randee Romero is a 46 y.o. female. Presents today as a New patient here to establish care and complaints of SOA  Chief Complaint   Patient presents with   • Annual Exam     New patient physical est care   • Shortness of Breath     History of Present Illness     Previous PCP: none  Significant medical hx: IV drug use, Hep C ( states never got tx), endocarditis, TVr and PFO closure, HTN, Hx syphilis - completed tx  Significant family hx: brain ca (mother)    Denies current alcohol, drug use- Hx Heroin use. States stopped after first hospitalization   Smokes cigarettes 0.5 pack/day- wants to quit    Hospitalization Hx  Hx  left VATs 4/15/19 AND TVr and PFO closure 4/29/19   She was admitted to [facility] from [place] 12/25 to 12/31/2021 and evaluated for endocarditis, bacteremia, JASMIN and poor dentition by oral sx but left AMA  She went back on 1/1/22 to continue her treatment. Dx of Sepsis/bacteremia, recurrent endocarditis,  MRSA BSI secondary to prosthetic valve.   She underwent angio vac on 1/25  She was also started on HD x 6 weeks and discharged with IV antibiotics with HD- HD @ [facility]    Pt [facility] has been compliant with HD- although per notes seems she missed a couple  Unfortunately I cannot see results of recent lab- last available are from hospital stay- - pt [facility] last CR 1.06 - will try to get records  [facility] has appointment to see Dr Mosley on monday    Shortness of air  States has had since got discharged- worsening   Cannot roll in bed without getting SOA  Has used inhaler with no relief.   occasional CP- swelling in ELFEGO lower extremity .   No fever, chills.     HM  PAP- does not remember last, no hx abnormal paps.   Never had colonoscopy- due  Diet: states generally healthy, cooks at home. Does not watch salt, trying to cut down. Tries to limit sugars.   Cannot exercise much due to SOA..     Review of Systems   Constitutional: Positive for fatigue. Negative for chills, fever and  unexpected weight change.   Eyes: Negative for visual disturbance.   Respiratory: Positive for shortness of breath. Negative for cough, chest tightness and wheezing.    Cardiovascular: Positive for leg swelling. Negative for chest pain and palpitations.   Gastrointestinal: Negative for abdominal distention, abdominal pain, constipation and diarrhea.   Neurological: Negative for weakness.   Psychiatric/Behavioral: Negative for sleep disturbance.       Patient Active Problem List   Diagnosis   • Bacterial endocarditis, Tricuspid valve   • IV drug user   • Hepatitis C   • Obesity (BMI 30-39.9)   • Tobacco abuse   • Polysubstance abuse (HCC)   • Acute kidney injury (HCC)   • Hypomagnesemia   • Lactic acidosis   • Acute respiratory failure with hypoxia and hypercapnia (HCC)   • Anemia   • Bacteremia   • Dental caries   • Dysphagia   • Elevated LFTs   • Endocarditis of tricuspid valve   • Endocarditis   • ESRD on hemodialysis (HCC)   • Generalized pain   • Grand multipara   • Hemothorax on left   • History of drug use   • Hypertension   • Hyponatremia   • Iron deficiency anemia   • IUFD (intrauterine fetal death)   • Loculated pleural effusion   • MRSA bacteremia   • Multifocal pneumonia   • Previous  section   • PVC's (premature ventricular contractions)   • S/P tricuspid valve repair   • Sepsis (HCC)   • Septic pulmonary embolism (HCC)   • Sinus tachycardia   • Syphilis   • Thrombocytopenia due to drugs   • Tricuspid regurgitation   • Tricuspid valve vegetation   • Hepatitis C virus infection   • Intravenous drug user   • Drug abuse (HCC)     Past Medical History:   Diagnosis Date   • Endocarditis    • Hepatitis C    • Heroin abuse (HCC)    • IV drug user    • Obese      Past Surgical History:   Procedure Laterality Date   •  SECTION     • PILONIDAL CYST DRAINAGE     • TRICUSPID VALVE SURGERY      Repair   • TRICUSPID VALVE SURGERY       No family history on file.  Social History     Socioeconomic  "History   • Marital status: Single   Tobacco Use   • Smoking status: Current Every Day Smoker     Packs/day: 0.50     Years: 29.00     Pack years: 14.50     Start date: 1993   • Smokeless tobacco: Never Used   Substance and Sexual Activity   • Alcohol use: Not Currently   • Drug use: Yes     Types: Methamphetamines, Cocaine(coke)     Comment: heroin   • Sexual activity: Defer       No Known Allergies    Current Outpatient Medications on File Prior to Visit   Medication Sig Dispense Refill   • Aspirin Low Dose 81 MG EC tablet      • calcium acetate (PHOS BINDER,) 667 MG capsule capsule Take 667 mg by mouth 3 (Three) Times a Day With Meals.       No current facility-administered medications on file prior to visit.       Objective   Vitals:    03/10/22 0855   BP: 126/98   Pulse: 116   SpO2: 98%   Weight: 99.3 kg (219 lb)   Height: 165.1 cm (65\")   PainSc: 0-No pain     Body mass index is 36.44 kg/m².  Physical Exam  Constitutional:       Appearance: Normal appearance. She is not ill-appearing.   HENT:      Mouth/Throat:      Mouth: Mucous membranes are moist.      Dentition: Abnormal dentition.      Pharynx: Oropharynx is clear. No oropharyngeal exudate or posterior oropharyngeal erythema.   Eyes:      Conjunctiva/sclera: Conjunctivae normal.      Pupils: Pupils are equal, round, and reactive to light.   Neck:      Thyroid: No thyroid mass, thyromegaly or thyroid tenderness.      Vascular: Normal carotid pulses. JVD present. No carotid bruit.   Cardiovascular:      Rate and Rhythm: Normal rate. Rhythm irregular.  No extrasystoles are present.     Pulses:           Carotid pulses are 2+ on the right side and 2+ on the left side.       Posterior tibial pulses are 1+ on the right side and 1+ on the left side.      Heart sounds: Murmur heard.   Pulmonary:      Effort: Pulmonary effort is normal.      Breath sounds: Normal breath sounds. No decreased breath sounds, wheezing or rhonchi.   Chest:       Abdominal:      " General: Bowel sounds are normal.      Palpations: Abdomen is soft.      Tenderness: There is no abdominal tenderness.   Musculoskeletal:      Right lower le+ Pitting Edema present.      Left lower le+ Pitting Edema present.   Lymphadenopathy:      Cervical: No cervical adenopathy.   Neurological:      General: No focal deficit present.      Mental Status: She is alert and oriented to person, place, and time.   Psychiatric:         Attention and Perception: Attention normal.         Mood and Affect: Mood normal.         Speech: Speech normal.         Behavior: Behavior normal.         Thought Content: Thought content normal.         Cognition and Memory: Cognition normal.         Judgment: Judgment normal.       ECG 12 Lead    Date/Time: 3/10/2022 1:00 PM  Performed by: J Carlos Leigh APRN  Authorized by: J Carlos Leigh APRN   Comparison: compared with previous ECG from 2021  Rhythm: sinus rhythm  Rate: normal  Other findings: left atrial abnormality    Clinical impression: non-specific ECG          Assessment/Plan   Diagnoses and all orders for this visit:    1. Annual physical exam (Primary)  -     CBC & Differential  -     Comprehensive Metabolic Panel  -     Hemoglobin A1c  -     Lipid Panel  -     TSH  -     Advised compliance with HH and renal diet. Limit sodium. Stay hydrated. Routine activity as tolerated.   -    Discussed recommended screenings. Needs to get pap updated. CRC screen once other medical conditions stable.    2. Tobacco abuse  -     nicotine (NICODERM CQ) 21 MG/24HR patch; Place 1 patch on the skin as directed by provider Daily.  Dispense: 28 each; Refill: 0  -     nicotine polacrilex (Nicotine Mini) 4 MG lozenge; Dissolve 1 lozenge in the mouth As Needed for Smoking Cessation.  Dispense: 108 each; Refill: 0  -    -  I advised the patient of the risks of tobacco use and discussed smoking cessation treatment options.  I have also discussed ways to quit smoking. The patient has  expressed the willingness to quit and is interested in tx.   -During this visit, I spent approximately 10 mintues counseling the patient regarding smoking cessation.     3. Encounter for smoking cessation counseling  -     nicotine (NICODERM CQ) 21 MG/24HR patch; Place 1 patch on the skin as directed by provider Daily.  Dispense: 28 each; Refill: 0  -     nicotine polacrilex (Nicotine Mini) 4 MG lozenge; Dissolve 1 lozenge in the mouth As Needed for Smoking Cessation.  Dispense: 108 each; Refill: 0    4. Primary hypertension  -     CBC & Differential  -     Comprehensive Metabolic Panel  -     Lipid Panel  -     TSH  -      BP stable. Will check labs.    5. Obesity (BMI 30-39.9)  -     Hemoglobin A1c  -     Lipid Panel  -     TSH    6. Lipid screening  -     Lipid Panel    7. History of hepatitis C  -     Hepatitis C RNA, Quantitative, PCR (graph)- states never treated. Will get labs and refer as needed.     8. Dyspnea on exertion  -     ECG 12 Lead  -     XR Chest PA & Lateral (In Office)  -     Sedimentation Rate  -     Xray with possible cardiomegaly? No other abnormalities noted. Will send to radiology to read. ECG borderline, reviewed with Dr Adame, does not think acute abnormalities.   -     Exam with signs of overload, will give a short dose of diuretic as seems last Cr close to normal- attempted to get records from dialysis center but closed- will attempt again on 3/11.   -     Pt seems stable in no acute distress and vitals stable. discussed use and SE of diuretics. For worsening symptoms go to ED immediately. Make sure to keep follow up appointment with nephrology, cardiology and ID next week, wrote all dates and times and given to pt.  States understanding.   -     furosemide (Lasix) 20 MG tablet; Take 1 tablet by mouth Daily for 5 days.  Dispense: 5 tablet; Refill: 0    9. Lower extremity edema  -     furosemide (Lasix) 20 MG tablet; Take 1 tablet by mouth Daily for 5 days.  Dispense: 5 tablet; Refill:  0         -Follow up: 1 month, sooner as needed.      - Pt agrees with plan of care and states no further concerns or questions today    This document is intended for medical expert use only. Reading of this document by patients and/or patient's family without participating medical staff guidance may result in misinterpretation and unintended morbidity.  Any interpretation of such data is the responsibility of the patient and/or family member responsible for the patient in concert with their primary or specialist providers, not to be left for sources of online searches such as [employer], [employer] or similar queries. Relying on these approaches to knowledge may result in misinterpretation, misguided goals of care and even death should patients or family members try recommendations outside of the realm of professional medical care in a supervised way.     Please allow 3-5 business days for recommendations based on new results     Go to the ER for any possible lifethreatening symptoms such as chest pain or shortness of air.      I personally spent 45 minutes with this patient, preparing for the visit, reviewing tests, obtaining and/or reviewing a separately obtained history, performing a medically appropriate examination and/or evaluation , counseling and educating the patient/family/caregiver, ordering medications, tests, or procedures, documenting information in the medical record and independently interpreting results.

## 2022-03-12 LAB
ALBUMIN SERPL-MCNC: 3.8 G/DL (ref 3.8–4.8)
ALBUMIN/GLOB SERPL: 0.8 {RATIO} (ref 1.2–2.2)
ALP SERPL-CCNC: 108 IU/L (ref 44–121)
ALT SERPL-CCNC: 7 IU/L (ref 0–32)
AST SERPL-CCNC: 14 IU/L (ref 0–40)
BASOPHILS # BLD AUTO: 0.1 X10E3/UL (ref 0–0.2)
BASOPHILS NFR BLD AUTO: 1 %
BILIRUB SERPL-MCNC: 0.3 MG/DL (ref 0–1.2)
BUN SERPL-MCNC: 16 MG/DL (ref 6–24)
BUN/CREAT SERPL: 11 (ref 9–23)
CALCIUM SERPL-MCNC: 9.2 MG/DL (ref 8.7–10.2)
CHLORIDE SERPL-SCNC: 104 MMOL/L (ref 96–106)
CHOLEST SERPL-MCNC: 133 MG/DL (ref 100–199)
CO2 SERPL-SCNC: 17 MMOL/L (ref 20–29)
CREAT SERPL-MCNC: 1.5 MG/DL (ref 0.57–1)
EGFR GENE MUT ANL BLD/T: 44 ML/MIN/1.73
EOSINOPHIL # BLD AUTO: 0.1 X10E3/UL (ref 0–0.4)
EOSINOPHIL NFR BLD AUTO: 1 %
ERYTHROCYTE [DISTWIDTH] IN BLOOD BY AUTOMATED COUNT: 14.9 % (ref 11.7–15.4)
ERYTHROCYTE [SEDIMENTATION RATE] IN BLOOD BY WESTERGREN METHOD: 102 MM/HR (ref 0–32)
GLOBULIN SER CALC-MCNC: 4.9 G/DL (ref 1.5–4.5)
GLUCOSE SERPL-MCNC: 97 MG/DL (ref 65–99)
HBA1C MFR BLD: 5.1 % (ref 4.8–5.6)
HCT VFR BLD AUTO: 35.9 % (ref 34–46.6)
HCV RNA SERPL NAA+PROBE-ACNC: 1760 IU/ML
HCV RNA SERPL NAA+PROBE-LOG IU: 3.25 LOG10 IU/ML
HDLC SERPL-MCNC: 38 MG/DL
HGB BLD-MCNC: 11.1 G/DL (ref 11.1–15.9)
IMM GRANULOCYTES # BLD AUTO: 0 X10E3/UL (ref 0–0.1)
IMM GRANULOCYTES NFR BLD AUTO: 0 %
LDLC SERPL CALC-MCNC: 70 MG/DL (ref 0–99)
LYMPHOCYTES # BLD AUTO: 2.1 X10E3/UL (ref 0.7–3.1)
LYMPHOCYTES NFR BLD AUTO: 28 %
MCH RBC QN AUTO: 27.1 PG (ref 26.6–33)
MCHC RBC AUTO-ENTMCNC: 30.9 G/DL (ref 31.5–35.7)
MCV RBC AUTO: 88 FL (ref 79–97)
MONOCYTES # BLD AUTO: 0.6 X10E3/UL (ref 0.1–0.9)
MONOCYTES NFR BLD AUTO: 8 %
NEUTROPHILS # BLD AUTO: 4.4 X10E3/UL (ref 1.4–7)
NEUTROPHILS NFR BLD AUTO: 62 %
PLATELET # BLD AUTO: 354 X10E3/UL (ref 150–450)
POTASSIUM SERPL-SCNC: 4.8 MMOL/L (ref 3.5–5.2)
PROT SERPL-MCNC: 8.7 G/DL (ref 6–8.5)
RBC # BLD AUTO: 4.09 X10E6/UL (ref 3.77–5.28)
SODIUM SERPL-SCNC: 138 MMOL/L (ref 134–144)
TEST INFORMATION: NORMAL
TRIGL SERPL-MCNC: 145 MG/DL (ref 0–149)
TSH SERPL DL<=0.005 MIU/L-ACNC: 2.95 UIU/ML (ref 0.45–4.5)
VLDLC SERPL CALC-MCNC: 25 MG/DL (ref 5–40)
WBC # BLD AUTO: 7.2 X10E3/UL (ref 3.4–10.8)

## 2022-03-15 NOTE — PROGRESS NOTES
Please inform pt of lab results  Creatinine 1.5 but kidney function is still low  Sed rate (inflammatory marker) elevated. Could be due to recent infection.   Other labs normal.     Did she follow up with cardiology and nephrology?

## 2022-03-31 ENCOUNTER — DOCUMENTATION (OUTPATIENT)
Dept: CT IMAGING | Facility: HOSPITAL | Age: 47
End: 2022-03-31

## 2022-04-26 ENCOUNTER — TELEPHONE (OUTPATIENT)
Dept: FAMILY MEDICINE CLINIC | Facility: CLINIC | Age: 47
End: 2022-04-26

## 2022-04-26 NOTE — TELEPHONE ENCOUNTER
PATIENT IS NEEDING A LETTER TO STATE HOW MUCH LONGER SHE WILL BE OFF. I TOLD HER YOU WAS GONE FOR THE DAY. PLEASE ADVISE.

## 2022-04-26 NOTE — TELEPHONE ENCOUNTER
Caller: Randee Romero    Relationship: Self    Best call back number: [phone]    What form or medical record are you requesting: LETTER STATING APPROX HOW MUCH LONGER PATIENT WILL BE OFF WORK.    Who is requesting this form or medical record from you: PATIENT    How would you like to receive the form or medical records (pick-up, mail, fax):     If fax, what is the fax number:   If mail, what is the address:   If pick-up, provide patient with address and location details    Timeframe paperwork needed: AS SOON AS POSSIBLE TODAY OR TOMORROW    Additional notes: PATIENT IS NEEDING TO TAKE THIS TO BE ABLE TO RECEIVE FOOD STAMPS WHILE OFF WORK.

## 2022-04-27 ENCOUNTER — DOCUMENTATION (OUTPATIENT)
Dept: CT IMAGING | Facility: HOSPITAL | Age: 47
End: 2022-04-27

## 2022-04-27 NOTE — PROGRESS NOTES
Message and  A notification letter was sent to the patient explaining that a recent imaging exam showed an incidental finding, for which follow-up testing was recommended.

## 2022-04-27 NOTE — TELEPHONE ENCOUNTER
She has been no show twice with me on her recent appointments and I did not give her any letter on her one visit to stay off work. I am not sure if she has seen cardiology and nephrology but probably needs to get from them.